# Patient Record
Sex: FEMALE | Race: WHITE | Employment: OTHER | URBAN - METROPOLITAN AREA
[De-identification: names, ages, dates, MRNs, and addresses within clinical notes are randomized per-mention and may not be internally consistent; named-entity substitution may affect disease eponyms.]

---

## 2019-01-01 ENCOUNTER — APPOINTMENT (OUTPATIENT)
Dept: CT IMAGING | Age: 79
DRG: 189 | End: 2019-01-01
Attending: HOSPITALIST
Payer: MEDICARE

## 2019-01-01 ENCOUNTER — HOSPITAL ENCOUNTER (INPATIENT)
Age: 79
LOS: 6 days | Discharge: HOSPICE | DRG: 189 | End: 2019-09-18
Attending: EMERGENCY MEDICINE | Admitting: HOSPITALIST
Payer: MEDICARE

## 2019-01-01 ENCOUNTER — HOME CARE VISIT (OUTPATIENT)
Dept: SCHEDULING | Facility: HOME HEALTH | Age: 79
End: 2019-01-01
Payer: MEDICARE

## 2019-01-01 ENCOUNTER — APPOINTMENT (OUTPATIENT)
Dept: GENERAL RADIOLOGY | Age: 79
DRG: 189 | End: 2019-01-01
Attending: EMERGENCY MEDICINE
Payer: MEDICARE

## 2019-01-01 ENCOUNTER — PATIENT OUTREACH (OUTPATIENT)
Dept: FAMILY MEDICINE CLINIC | Age: 79
End: 2019-01-01

## 2019-01-01 ENCOUNTER — HOME CARE VISIT (OUTPATIENT)
Dept: HOSPICE | Facility: HOSPICE | Age: 79
End: 2019-01-01
Payer: MEDICARE

## 2019-01-01 ENCOUNTER — APPOINTMENT (OUTPATIENT)
Dept: VASCULAR SURGERY | Age: 79
DRG: 189 | End: 2019-01-01
Attending: INTERNAL MEDICINE
Payer: MEDICARE

## 2019-01-01 ENCOUNTER — HOSPICE ADMISSION (OUTPATIENT)
Dept: HOSPICE | Facility: HOSPICE | Age: 79
End: 2019-01-01
Payer: MEDICARE

## 2019-01-01 ENCOUNTER — APPOINTMENT (OUTPATIENT)
Dept: NON INVASIVE DIAGNOSTICS | Age: 79
DRG: 189 | End: 2019-01-01
Attending: INTERNAL MEDICINE
Payer: MEDICARE

## 2019-01-01 VITALS
HEART RATE: 104 BPM | SYSTOLIC BLOOD PRESSURE: 108 MMHG | HEIGHT: 59 IN | RESPIRATION RATE: 22 BRPM | WEIGHT: 106 LBS | OXYGEN SATURATION: 95 % | TEMPERATURE: 98.4 F | BODY MASS INDEX: 21.37 KG/M2 | DIASTOLIC BLOOD PRESSURE: 66 MMHG

## 2019-01-01 VITALS — OXYGEN SATURATION: 94 % | TEMPERATURE: 96.3 F | RESPIRATION RATE: 16 BRPM | HEART RATE: 85 BPM

## 2019-01-01 VITALS
HEIGHT: 59 IN | RESPIRATION RATE: 18 BRPM | BODY MASS INDEX: 21.38 KG/M2 | SYSTOLIC BLOOD PRESSURE: 168 MMHG | DIASTOLIC BLOOD PRESSURE: 55 MMHG | TEMPERATURE: 97.8 F | HEART RATE: 60 BPM | OXYGEN SATURATION: 100 % | WEIGHT: 106.04 LBS

## 2019-01-01 VITALS — OXYGEN SATURATION: 93 % | OXYGEN SATURATION: 81 %

## 2019-01-01 VITALS
OXYGEN SATURATION: 97 % | HEART RATE: 81 BPM | TEMPERATURE: 97.8 F | DIASTOLIC BLOOD PRESSURE: 58 MMHG | RESPIRATION RATE: 18 BRPM | SYSTOLIC BLOOD PRESSURE: 158 MMHG

## 2019-01-01 DIAGNOSIS — J96.22 ACUTE ON CHRONIC RESPIRATORY FAILURE WITH HYPOXIA AND HYPERCAPNIA (HCC): ICD-10-CM

## 2019-01-01 DIAGNOSIS — J96.21 ACUTE ON CHRONIC RESPIRATORY FAILURE WITH HYPOXIA AND HYPERCAPNIA (HCC): ICD-10-CM

## 2019-01-01 DIAGNOSIS — J44.1 COPD EXACERBATION (HCC): ICD-10-CM

## 2019-01-01 DIAGNOSIS — Z51.5 COMFORT MEASURES ONLY STATUS: ICD-10-CM

## 2019-01-01 DIAGNOSIS — J96.02 ACUTE RESPIRATORY FAILURE WITH HYPOXIA AND HYPERCAPNIA (HCC): Primary | ICD-10-CM

## 2019-01-01 DIAGNOSIS — R91.8 LUNG MASS: ICD-10-CM

## 2019-01-01 DIAGNOSIS — Z71.89 ADVANCED CARE PLANNING/COUNSELING DISCUSSION: ICD-10-CM

## 2019-01-01 DIAGNOSIS — J96.01 ACUTE RESPIRATORY FAILURE WITH HYPOXIA AND HYPERCAPNIA (HCC): Primary | ICD-10-CM

## 2019-01-01 DIAGNOSIS — Z99.89 BIPAP (BIPHASIC POSITIVE AIRWAY PRESSURE) DEPENDENCE: ICD-10-CM

## 2019-01-01 LAB
ALBUMIN SERPL-MCNC: 2.7 G/DL (ref 3.4–5)
ALBUMIN SERPL-MCNC: 3.6 G/DL (ref 3.4–5)
ALBUMIN/GLOB SERPL: 1.3 {RATIO} (ref 0.8–1.7)
ALBUMIN/GLOB SERPL: 1.4 {RATIO} (ref 0.8–1.7)
ALP SERPL-CCNC: 108 U/L (ref 45–117)
ALP SERPL-CCNC: 64 U/L (ref 45–117)
ALT SERPL-CCNC: 25 U/L (ref 13–56)
ALT SERPL-CCNC: 35 U/L (ref 13–56)
ANION GAP SERPL CALC-SCNC: 5 MMOL/L (ref 3–18)
ANION GAP SERPL CALC-SCNC: 6 MMOL/L (ref 3–18)
ANION GAP SERPL CALC-SCNC: 6 MMOL/L (ref 3–18)
ANION GAP SERPL CALC-SCNC: ABNORMAL MMOL/L (ref 3–18)
ANION GAP SERPL CALC-SCNC: ABNORMAL MMOL/L (ref 3–18)
APPEARANCE UR: CLEAR
ARTERIAL PATENCY WRIST A: ABNORMAL
ARTERIAL PATENCY WRIST A: ABNORMAL
ARTERIAL PATENCY WRIST A: YES
AST SERPL-CCNC: 21 U/L (ref 10–38)
AST SERPL-CCNC: 22 U/L (ref 10–38)
ATRIAL RATE: 76 BPM
BACTERIA SPEC CULT: NORMAL
BACTERIA URNS QL MICRO: ABNORMAL /HPF
BASE EXCESS BLD CALC-SCNC: 19 MMOL/L
BASE EXCESS BLD CALC-SCNC: 22 MMOL/L
BASE EXCESS BLD CALC-SCNC: 26 MMOL/L
BASE EXCESS BLD CALC-SCNC: 27 MMOL/L
BASE EXCESS BLDV CALC-SCNC: 26 MMOL/L
BASOPHILS # BLD: 0 K/UL (ref 0–0.1)
BASOPHILS NFR BLD: 0 % (ref 0–2)
BDY SITE: ABNORMAL
BILIRUB DIRECT SERPL-MCNC: 0.3 MG/DL (ref 0–0.2)
BILIRUB SERPL-MCNC: 0.7 MG/DL (ref 0.2–1)
BILIRUB SERPL-MCNC: 0.8 MG/DL (ref 0.2–1)
BILIRUB UR QL: NEGATIVE
BNP SERPL-MCNC: 5425 PG/ML (ref 0–1800)
BNP SERPL-MCNC: 6744 PG/ML (ref 0–1800)
BODY TEMPERATURE: 97.2
BODY TEMPERATURE: 98.3
BUN SERPL-MCNC: 20 MG/DL (ref 7–18)
BUN SERPL-MCNC: 22 MG/DL (ref 7–18)
BUN SERPL-MCNC: 25 MG/DL (ref 7–18)
BUN SERPL-MCNC: 34 MG/DL (ref 7–18)
BUN SERPL-MCNC: 39 MG/DL (ref 7–18)
BUN/CREAT SERPL: 20 (ref 12–20)
BUN/CREAT SERPL: 23 (ref 12–20)
BUN/CREAT SERPL: 24 (ref 12–20)
BUN/CREAT SERPL: 28 (ref 12–20)
BUN/CREAT SERPL: 36 (ref 12–20)
CALCIUM SERPL-MCNC: 8.2 MG/DL (ref 8.5–10.1)
CALCIUM SERPL-MCNC: 8.3 MG/DL (ref 8.5–10.1)
CALCIUM SERPL-MCNC: 8.3 MG/DL (ref 8.5–10.1)
CALCIUM SERPL-MCNC: 8.4 MG/DL (ref 8.5–10.1)
CALCIUM SERPL-MCNC: 8.8 MG/DL (ref 8.5–10.1)
CALCULATED P AXIS, ECG09: 86 DEGREES
CALCULATED R AXIS, ECG10: 87 DEGREES
CALCULATED T AXIS, ECG11: 56 DEGREES
CHLORIDE SERPL-SCNC: 103 MMOL/L (ref 100–111)
CHLORIDE SERPL-SCNC: 87 MMOL/L (ref 100–111)
CHLORIDE SERPL-SCNC: 89 MMOL/L (ref 100–111)
CHLORIDE SERPL-SCNC: 92 MMOL/L (ref 100–111)
CHLORIDE SERPL-SCNC: 97 MMOL/L (ref 100–111)
CK MB CFR SERPL CALC: 10 % (ref 0–4)
CK MB SERPL-MCNC: 3 NG/ML (ref 5–25)
CK SERPL-CCNC: 30 U/L (ref 26–192)
CO2 SERPL-SCNC: 34 MMOL/L (ref 21–32)
CO2 SERPL-SCNC: 36 MMOL/L (ref 21–32)
CO2 SERPL-SCNC: 43 MMOL/L (ref 21–32)
CO2 SERPL-SCNC: >45 MMOL/L (ref 21–32)
CO2 SERPL-SCNC: >45 MMOL/L (ref 21–32)
COLOR UR: YELLOW
CREAT SERPL-MCNC: 0.88 MG/DL (ref 0.6–1.3)
CREAT SERPL-MCNC: 1.06 MG/DL (ref 0.6–1.3)
CREAT SERPL-MCNC: 1.08 MG/DL (ref 0.6–1.3)
CREAT SERPL-MCNC: 1.09 MG/DL (ref 0.6–1.3)
CREAT SERPL-MCNC: 1.21 MG/DL (ref 0.6–1.3)
DIAGNOSIS, 93000: NORMAL
DIFFERENTIAL METHOD BLD: ABNORMAL
ECHO AO ROOT DIAM: 3.2 CM
ECHO LV E' LATERAL VELOCITY: 6 CM/S
ECHO LV E' SEPTAL VELOCITY: 4 CM/S
ECHO LV EDV A2C: 32.4 ML
ECHO LV EDV A4C: 32.3 ML
ECHO LV EDV BP: 34 ML (ref 56–104)
ECHO LV EDV INDEX A4C: 22.9 ML/M2
ECHO LV EDV INDEX BP: 24.1 ML/M2
ECHO LV EDV NDEX A2C: 23 ML/M2
ECHO LV EJECTION FRACTION A2C: 60 %
ECHO LV EJECTION FRACTION A4C: 68 %
ECHO LV EJECTION FRACTION BIPLANE: 64.8 % (ref 55–100)
ECHO LV ESV A2C: 13.1 ML
ECHO LV ESV A4C: 10.4 ML
ECHO LV ESV BP: 12 ML (ref 19–49)
ECHO LV ESV INDEX A2C: 9.3 ML/M2
ECHO LV ESV INDEX A4C: 7.4 ML/M2
ECHO LV ESV INDEX BP: 8.5 ML/M2
ECHO LV INTERNAL DIMENSION DIASTOLIC: 4.12 CM (ref 3.9–5.3)
ECHO LV INTERNAL DIMENSION SYSTOLIC: 2.57 CM
ECHO LV IVSD: 0.89 CM (ref 0.6–0.9)
ECHO LV MASS 2D: 123.2 G (ref 67–162)
ECHO LV MASS INDEX 2D: 87.5 G/M2 (ref 43–95)
ECHO LV POSTERIOR WALL DIASTOLIC: 0.86 CM (ref 0.6–0.9)
ECHO LVOT DIAM: 1.88 CM
ECHO MV A VELOCITY: 190.51 CM/S
ECHO MV AREA PHT: 7.1 CM2
ECHO MV E DECELERATION TIME (DT): 106.8 MS
ECHO MV E VELOCITY: 115.6 CM/S
ECHO MV E/A RATIO: 0.61
ECHO MV E/E' LATERAL: 19.27
ECHO MV E/E' RATIO (AVERAGED): 24.08
ECHO MV E/E' SEPTAL: 28.9
ECHO MV PRESSURE HALF TIME (PHT): 31 MS
ECHO TV REGURGITANT MAX VELOCITY: 279.91 CM/S
ECHO TV REGURGITANT PEAK GRADIENT: 31.3 MMHG
EOSINOPHIL # BLD: 0 K/UL (ref 0–0.4)
EOSINOPHIL NFR BLD: 0 % (ref 0–5)
EOSINOPHIL NFR BLD: 1 % (ref 0–5)
EPITH CASTS URNS QL MICRO: ABNORMAL /LPF (ref 0–5)
ERYTHROCYTE [DISTWIDTH] IN BLOOD BY AUTOMATED COUNT: 14 % (ref 11.6–14.5)
ERYTHROCYTE [DISTWIDTH] IN BLOOD BY AUTOMATED COUNT: 14 % (ref 11.6–14.5)
ERYTHROCYTE [DISTWIDTH] IN BLOOD BY AUTOMATED COUNT: 14.2 % (ref 11.6–14.5)
ERYTHROCYTE [DISTWIDTH] IN BLOOD BY AUTOMATED COUNT: 14.2 % (ref 11.6–14.5)
ERYTHROCYTE [DISTWIDTH] IN BLOOD BY AUTOMATED COUNT: 14.4 % (ref 11.6–14.5)
EST. AVERAGE GLUCOSE BLD GHB EST-MCNC: 143 MG/DL
GAS FLOW.O2 O2 DELIVERY SYS: ABNORMAL L/MIN
GLOBULIN SER CALC-MCNC: 2.1 G/DL (ref 2–4)
GLOBULIN SER CALC-MCNC: 2.6 G/DL (ref 2–4)
GLUCOSE BLD STRIP.AUTO-MCNC: 103 MG/DL (ref 70–110)
GLUCOSE BLD STRIP.AUTO-MCNC: 111 MG/DL (ref 70–110)
GLUCOSE BLD STRIP.AUTO-MCNC: 112 MG/DL (ref 70–110)
GLUCOSE BLD STRIP.AUTO-MCNC: 112 MG/DL (ref 70–110)
GLUCOSE BLD STRIP.AUTO-MCNC: 113 MG/DL (ref 70–110)
GLUCOSE BLD STRIP.AUTO-MCNC: 113 MG/DL (ref 70–110)
GLUCOSE BLD STRIP.AUTO-MCNC: 114 MG/DL (ref 70–110)
GLUCOSE BLD STRIP.AUTO-MCNC: 118 MG/DL (ref 70–110)
GLUCOSE BLD STRIP.AUTO-MCNC: 125 MG/DL (ref 70–110)
GLUCOSE BLD STRIP.AUTO-MCNC: 126 MG/DL (ref 70–110)
GLUCOSE BLD STRIP.AUTO-MCNC: 126 MG/DL (ref 70–110)
GLUCOSE BLD STRIP.AUTO-MCNC: 131 MG/DL (ref 70–110)
GLUCOSE BLD STRIP.AUTO-MCNC: 133 MG/DL (ref 70–110)
GLUCOSE BLD STRIP.AUTO-MCNC: 135 MG/DL (ref 70–110)
GLUCOSE BLD STRIP.AUTO-MCNC: 183 MG/DL (ref 70–110)
GLUCOSE BLD STRIP.AUTO-MCNC: 185 MG/DL (ref 70–110)
GLUCOSE SERPL-MCNC: 108 MG/DL (ref 74–99)
GLUCOSE SERPL-MCNC: 109 MG/DL (ref 74–99)
GLUCOSE SERPL-MCNC: 125 MG/DL (ref 74–99)
GLUCOSE SERPL-MCNC: 154 MG/DL (ref 74–99)
GLUCOSE SERPL-MCNC: 95 MG/DL (ref 74–99)
GLUCOSE UR STRIP.AUTO-MCNC: NEGATIVE MG/DL
HBA1C MFR BLD: 6.6 % (ref 4.2–5.6)
HCO3 BLD-SCNC: 42.6 MMOL/L (ref 22–26)
HCO3 BLD-SCNC: 47.3 MMOL/L (ref 22–26)
HCO3 BLD-SCNC: 50.6 MMOL/L (ref 22–26)
HCO3 BLD-SCNC: 51.1 MMOL/L (ref 22–26)
HCO3 BLDV-SCNC: 51 MMOL/L (ref 23–28)
HCT VFR BLD AUTO: 31.9 % (ref 35–45)
HCT VFR BLD AUTO: 36.7 % (ref 35–45)
HCT VFR BLD AUTO: 36.7 % (ref 35–45)
HCT VFR BLD AUTO: 37.4 % (ref 35–45)
HCT VFR BLD AUTO: 45.6 % (ref 35–45)
HGB BLD-MCNC: 10.1 G/DL (ref 12–16)
HGB BLD-MCNC: 11.1 G/DL (ref 12–16)
HGB BLD-MCNC: 11.2 G/DL (ref 12–16)
HGB BLD-MCNC: 11.3 G/DL (ref 12–16)
HGB BLD-MCNC: 13.3 G/DL (ref 12–16)
HGB UR QL STRIP: NEGATIVE
INR PPP: 1.1 (ref 0.8–1.2)
KETONES UR QL STRIP.AUTO: NEGATIVE MG/DL
LACTATE BLD-SCNC: 1.49 MMOL/L (ref 0.4–2)
LACTATE SERPL-SCNC: 0.8 MMOL/L (ref 0.4–2)
LACTATE SERPL-SCNC: 1.6 MMOL/L (ref 0.4–2)
LEUKOCYTE ESTERASE UR QL STRIP.AUTO: ABNORMAL
LYMPHOCYTES # BLD: 0.3 K/UL (ref 0.9–3.6)
LYMPHOCYTES # BLD: 0.4 K/UL (ref 0.9–3.6)
LYMPHOCYTES # BLD: 0.7 K/UL (ref 0.9–3.6)
LYMPHOCYTES NFR BLD: 10 % (ref 21–52)
LYMPHOCYTES NFR BLD: 13 % (ref 21–52)
LYMPHOCYTES NFR BLD: 15 % (ref 21–52)
LYMPHOCYTES NFR BLD: 22 % (ref 21–52)
LYMPHOCYTES NFR BLD: 7 % (ref 21–52)
MAGNESIUM SERPL-MCNC: 2.1 MG/DL (ref 1.6–2.6)
MCH RBC QN AUTO: 28.8 PG (ref 24–34)
MCH RBC QN AUTO: 29 PG (ref 24–34)
MCH RBC QN AUTO: 29 PG (ref 24–34)
MCH RBC QN AUTO: 29.2 PG (ref 24–34)
MCH RBC QN AUTO: 29.7 PG (ref 24–34)
MCHC RBC AUTO-ENTMCNC: 29.2 G/DL (ref 31–37)
MCHC RBC AUTO-ENTMCNC: 30.2 G/DL (ref 31–37)
MCHC RBC AUTO-ENTMCNC: 30.2 G/DL (ref 31–37)
MCHC RBC AUTO-ENTMCNC: 30.5 G/DL (ref 31–37)
MCHC RBC AUTO-ENTMCNC: 31.7 G/DL (ref 31–37)
MCV RBC AUTO: 93.8 FL (ref 74–97)
MCV RBC AUTO: 95.1 FL (ref 74–97)
MCV RBC AUTO: 95.6 FL (ref 74–97)
MCV RBC AUTO: 96.1 FL (ref 74–97)
MCV RBC AUTO: 99.3 FL (ref 74–97)
MONOCYTES # BLD: 0.1 K/UL (ref 0.05–1.2)
MONOCYTES # BLD: 0.1 K/UL (ref 0.05–1.2)
MONOCYTES # BLD: 0.2 K/UL (ref 0.05–1.2)
MONOCYTES NFR BLD: 2 % (ref 3–10)
MONOCYTES NFR BLD: 4 % (ref 3–10)
MONOCYTES NFR BLD: 5 % (ref 3–10)
MONOCYTES NFR BLD: 6 % (ref 3–10)
MONOCYTES NFR BLD: 6 % (ref 3–10)
NEUTS SEG # BLD: 2 K/UL (ref 1.8–8)
NEUTS SEG # BLD: 2.2 K/UL (ref 1.8–8)
NEUTS SEG # BLD: 2.3 K/UL (ref 1.8–8)
NEUTS SEG # BLD: 2.5 K/UL (ref 1.8–8)
NEUTS SEG # BLD: 3.3 K/UL (ref 1.8–8)
NEUTS SEG NFR BLD: 71 % (ref 40–73)
NEUTS SEG NFR BLD: 83 % (ref 40–73)
NEUTS SEG NFR BLD: 83 % (ref 40–73)
NEUTS SEG NFR BLD: 84 % (ref 40–73)
NEUTS SEG NFR BLD: 88 % (ref 40–73)
NITRITE UR QL STRIP.AUTO: NEGATIVE
O2/TOTAL GAS SETTING VFR VENT: 0.35 %
O2/TOTAL GAS SETTING VFR VENT: 0.35 %
O2/TOTAL GAS SETTING VFR VENT: 28 %
O2/TOTAL GAS SETTING VFR VENT: 28 %
O2/TOTAL GAS SETTING VFR VENT: 35 %
P-R INTERVAL, ECG05: 142 MS
PCO2 BLD: 60 MMHG (ref 35–45)
PCO2 BLD: 71.5 MMHG (ref 35–45)
PCO2 BLD: 76.7 MMHG (ref 35–45)
PCO2 BLD: 81.2 MMHG (ref 35–45)
PCO2 BLDV: 84.1 MMHG (ref 41–51)
PEEP RESPIRATORY: 6 CMH2O
PH BLD: 7.39 [PH] (ref 7.35–7.45)
PH BLD: 7.4 [PH] (ref 7.35–7.45)
PH BLD: 7.46 [PH] (ref 7.35–7.45)
PH BLD: 7.46 [PH] (ref 7.35–7.45)
PH BLDV: 7.39 [PH] (ref 7.32–7.42)
PH UR STRIP: 8 [PH] (ref 5–8)
PIP ISTAT,IPIP: 14
PIP ISTAT,IPIP: 14
PIP ISTAT,IPIP: 16
PLATELET # BLD AUTO: 102 K/UL (ref 135–420)
PLATELET # BLD AUTO: 122 K/UL (ref 135–420)
PLATELET # BLD AUTO: 78 K/UL (ref 135–420)
PLATELET # BLD AUTO: 82 K/UL (ref 135–420)
PLATELET # BLD AUTO: 83 K/UL (ref 135–420)
PLATELET COMMENTS,PCOM: ABNORMAL
PMV BLD AUTO: 10.7 FL (ref 9.2–11.8)
PMV BLD AUTO: 10.9 FL (ref 9.2–11.8)
PMV BLD AUTO: 11.1 FL (ref 9.2–11.8)
PMV BLD AUTO: 11.2 FL (ref 9.2–11.8)
PMV BLD AUTO: 11.2 FL (ref 9.2–11.8)
PO2 BLD: 59 MMHG (ref 80–100)
PO2 BLD: 72 MMHG (ref 80–100)
PO2 BLD: 74 MMHG (ref 80–100)
PO2 BLD: 88 MMHG (ref 80–100)
PO2 BLDV: 38 MMHG (ref 25–40)
POTASSIUM SERPL-SCNC: 3 MMOL/L (ref 3.5–5.5)
POTASSIUM SERPL-SCNC: 3.4 MMOL/L (ref 3.5–5.5)
POTASSIUM SERPL-SCNC: 3.5 MMOL/L (ref 3.5–5.5)
POTASSIUM SERPL-SCNC: 3.9 MMOL/L (ref 3.5–5.5)
POTASSIUM SERPL-SCNC: 3.9 MMOL/L (ref 3.5–5.5)
POTASSIUM SERPL-SCNC: 4.3 MMOL/L (ref 3.5–5.5)
PRESSURE SUPPORT SETTING VENT: 10 CMH2O
PRESSURE SUPPORT SETTING VENT: 10 CMH2O
PROCALCITONIN SERPL-MCNC: 0.1 NG/ML
PROT SERPL-MCNC: 4.8 G/DL (ref 6.4–8.2)
PROT SERPL-MCNC: 6.2 G/DL (ref 6.4–8.2)
PROT UR STRIP-MCNC: 30 MG/DL
PROTHROMBIN TIME: 13.5 SEC (ref 11.5–15.2)
Q-T INTERVAL, ECG07: 360 MS
QRS DURATION, ECG06: 68 MS
QTC CALCULATION (BEZET), ECG08: 405 MS
RBC # BLD AUTO: 3.4 M/UL (ref 4.2–5.3)
RBC # BLD AUTO: 3.84 M/UL (ref 4.2–5.3)
RBC # BLD AUTO: 3.86 M/UL (ref 4.2–5.3)
RBC # BLD AUTO: 3.89 M/UL (ref 4.2–5.3)
RBC # BLD AUTO: 4.59 M/UL (ref 4.2–5.3)
RBC #/AREA URNS HPF: ABNORMAL /HPF (ref 0–5)
RBC MORPH BLD: ABNORMAL
SAO2 % BLD: 88 % (ref 92–97)
SAO2 % BLD: 94 % (ref 92–97)
SAO2 % BLD: 95 % (ref 92–97)
SAO2 % BLD: 96 % (ref 92–97)
SAO2 % BLDV: 67 % (ref 65–88)
SERVICE CMNT-IMP: ABNORMAL
SERVICE CMNT-IMP: NORMAL
SODIUM SERPL-SCNC: 139 MMOL/L (ref 136–145)
SODIUM SERPL-SCNC: 140 MMOL/L (ref 136–145)
SODIUM SERPL-SCNC: 140 MMOL/L (ref 136–145)
SODIUM SERPL-SCNC: 141 MMOL/L (ref 136–145)
SODIUM SERPL-SCNC: 142 MMOL/L (ref 136–145)
SP GR UR REFRACTOMETRY: 1.03 (ref 1–1.03)
SPECIMEN TYPE: ABNORMAL
SPONTANEOUS TIMED, IST: YES
TOTAL RESP. RATE, ITRR: 15
TOTAL RESP. RATE, ITRR: 16
TOTAL RESP. RATE, ITRR: 20
TOTAL RESP. RATE, ITRR: 20
TOTAL RESP. RATE, ITRR: 30
TROPONIN I SERPL-MCNC: <0.02 NG/ML (ref 0–0.04)
UROBILINOGEN UR QL STRIP.AUTO: 1 EU/DL (ref 0.2–1)
VANCOMYCIN TROUGH SERPL-MCNC: 18.4 UG/ML (ref 10–20)
VENTRICULAR RATE, ECG03: 76 BPM
WBC # BLD AUTO: 2.4 K/UL (ref 4.6–13.2)
WBC # BLD AUTO: 2.7 K/UL (ref 4.6–13.2)
WBC # BLD AUTO: 2.7 K/UL (ref 4.6–13.2)
WBC # BLD AUTO: 3.4 K/UL (ref 4.6–13.2)
WBC # BLD AUTO: 3.7 K/UL (ref 4.6–13.2)
WBC URNS QL MICRO: ABNORMAL /HPF (ref 0–5)

## 2019-01-01 PROCEDURE — 74011250637 HC RX REV CODE- 250/637: Performed by: HOSPITALIST

## 2019-01-01 PROCEDURE — 85610 PROTHROMBIN TIME: CPT

## 2019-01-01 PROCEDURE — 82550 ASSAY OF CK (CPK): CPT

## 2019-01-01 PROCEDURE — 92610 EVALUATE SWALLOWING FUNCTION: CPT

## 2019-01-01 PROCEDURE — 65610000006 HC RM INTENSIVE CARE

## 2019-01-01 PROCEDURE — 96365 THER/PROPH/DIAG IV INF INIT: CPT

## 2019-01-01 PROCEDURE — 94640 AIRWAY INHALATION TREATMENT: CPT

## 2019-01-01 PROCEDURE — 80048 BASIC METABOLIC PNL TOTAL CA: CPT

## 2019-01-01 PROCEDURE — G0299 HHS/HOSPICE OF RN EA 15 MIN: HCPCS

## 2019-01-01 PROCEDURE — 82962 GLUCOSE BLOOD TEST: CPT

## 2019-01-01 PROCEDURE — 77030013140 HC MSK NEB VYRM -A

## 2019-01-01 PROCEDURE — 85025 COMPLETE CBC W/AUTO DIFF WBC: CPT

## 2019-01-01 PROCEDURE — 84145 PROCALCITONIN (PCT): CPT

## 2019-01-01 PROCEDURE — 93005 ELECTROCARDIOGRAM TRACING: CPT

## 2019-01-01 PROCEDURE — 93970 EXTREMITY STUDY: CPT

## 2019-01-01 PROCEDURE — 83605 ASSAY OF LACTIC ACID: CPT

## 2019-01-01 PROCEDURE — 77030011256 HC DRSG MEPILEX <16IN NO BORD MOLN -A

## 2019-01-01 PROCEDURE — 83880 ASSAY OF NATRIURETIC PEPTIDE: CPT

## 2019-01-01 PROCEDURE — 82803 BLOOD GASES ANY COMBINATION: CPT

## 2019-01-01 PROCEDURE — 74011250636 HC RX REV CODE- 250/636: Performed by: HOSPITALIST

## 2019-01-01 PROCEDURE — 74011250636 HC RX REV CODE- 250/636

## 2019-01-01 PROCEDURE — 74011000258 HC RX REV CODE- 258: Performed by: INTERNAL MEDICINE

## 2019-01-01 PROCEDURE — 77010033678 HC OXYGEN DAILY

## 2019-01-01 PROCEDURE — 74011000258 HC RX REV CODE- 258: Performed by: HOSPITALIST

## 2019-01-01 PROCEDURE — 36600 WITHDRAWAL OF ARTERIAL BLOOD: CPT

## 2019-01-01 PROCEDURE — 0651 HSPC ROUTINE HOME CARE

## 2019-01-01 PROCEDURE — 74011636637 HC RX REV CODE- 636/637: Performed by: HOSPITALIST

## 2019-01-01 PROCEDURE — T4541 LARGE DISPOSABLE UNDERPAD: HCPCS

## 2019-01-01 PROCEDURE — 3336500001 HSPC ELECTION

## 2019-01-01 PROCEDURE — 74011250636 HC RX REV CODE- 250/636: Performed by: FAMILY MEDICINE

## 2019-01-01 PROCEDURE — HHS10554 SHAMPOO/BODY WASH 8 OZ ALOE VESTA

## 2019-01-01 PROCEDURE — 74011250636 HC RX REV CODE- 250/636: Performed by: INTERNAL MEDICINE

## 2019-01-01 PROCEDURE — 84132 ASSAY OF SERUM POTASSIUM: CPT

## 2019-01-01 PROCEDURE — 87040 BLOOD CULTURE FOR BACTERIA: CPT

## 2019-01-01 PROCEDURE — 36415 COLL VENOUS BLD VENIPUNCTURE: CPT

## 2019-01-01 PROCEDURE — 77030037878 HC DRSG MEPILEX >48IN BORD MOLN -B

## 2019-01-01 PROCEDURE — 74011000250 HC RX REV CODE- 250: Performed by: HOSPITALIST

## 2019-01-01 PROCEDURE — 87086 URINE CULTURE/COLONY COUNT: CPT

## 2019-01-01 PROCEDURE — 74011000250 HC RX REV CODE- 250: Performed by: INTERNAL MEDICINE

## 2019-01-01 PROCEDURE — G0155 HHCP-SVS OF CSW,EA 15 MIN: HCPCS

## 2019-01-01 PROCEDURE — T4523 ADULT SIZE BRIEF/DIAPER LG: HCPCS

## 2019-01-01 PROCEDURE — 83735 ASSAY OF MAGNESIUM: CPT

## 2019-01-01 PROCEDURE — 77030037875 HC DRSG MEPILEX <16IN BORD MOLN -A

## 2019-01-01 PROCEDURE — 65270000029 HC RM PRIVATE

## 2019-01-01 PROCEDURE — 74011250636 HC RX REV CODE- 250/636: Performed by: NURSE PRACTITIONER

## 2019-01-01 PROCEDURE — 74011636320 HC RX REV CODE- 636/320: Performed by: HOSPITALIST

## 2019-01-01 PROCEDURE — 80202 ASSAY OF VANCOMYCIN: CPT

## 2019-01-01 PROCEDURE — 74011000250 HC RX REV CODE- 250: Performed by: EMERGENCY MEDICINE

## 2019-01-01 PROCEDURE — 93308 TTE F-UP OR LMTD: CPT

## 2019-01-01 PROCEDURE — 99285 EMERGENCY DEPT VISIT HI MDM: CPT

## 2019-01-01 PROCEDURE — 76450000000

## 2019-01-01 PROCEDURE — 71275 CT ANGIOGRAPHY CHEST: CPT

## 2019-01-01 PROCEDURE — A6250 SKIN SEAL PROTECT MOISTURIZR: HCPCS

## 2019-01-01 PROCEDURE — 81001 URINALYSIS AUTO W/SCOPE: CPT

## 2019-01-01 PROCEDURE — 96375 TX/PRO/DX INJ NEW DRUG ADDON: CPT

## 2019-01-01 PROCEDURE — 80053 COMPREHEN METABOLIC PANEL: CPT

## 2019-01-01 PROCEDURE — 94660 CPAP INITIATION&MGMT: CPT

## 2019-01-01 PROCEDURE — 5A09357 ASSISTANCE WITH RESPIRATORY VENTILATION, LESS THAN 24 CONSECUTIVE HOURS, CONTINUOUS POSITIVE AIRWAY PRESSURE: ICD-10-PCS | Performed by: HOSPITALIST

## 2019-01-01 PROCEDURE — 74011636637 HC RX REV CODE- 636/637: Performed by: INTERNAL MEDICINE

## 2019-01-01 PROCEDURE — HOSPICE MEDICATION HC HH HOSPICE MEDICATION

## 2019-01-01 PROCEDURE — 83036 HEMOGLOBIN GLYCOSYLATED A1C: CPT

## 2019-01-01 PROCEDURE — 3331090004 HSPC SERVICE INTENSITY ADD-ON

## 2019-01-01 PROCEDURE — A4927 NON-STERILE GLOVES: HCPCS

## 2019-01-01 PROCEDURE — P9047 ALBUMIN (HUMAN), 25%, 50ML: HCPCS | Performed by: INTERNAL MEDICINE

## 2019-01-01 PROCEDURE — 74011250637 HC RX REV CODE- 250/637: Performed by: NURSE PRACTITIONER

## 2019-01-01 PROCEDURE — A9270 NON-COVERED ITEM OR SERVICE: HCPCS

## 2019-01-01 PROCEDURE — 77030013052 HC MSK CPAP/BIPAP PHIL -B

## 2019-01-01 PROCEDURE — 80076 HEPATIC FUNCTION PANEL: CPT

## 2019-01-01 PROCEDURE — 77030035694 HC MSK BIPAP FLL FAC PERFMAX PHIL -B

## 2019-01-01 PROCEDURE — 74011250636 HC RX REV CODE- 250/636: Performed by: EMERGENCY MEDICINE

## 2019-01-01 PROCEDURE — 71045 X-RAY EXAM CHEST 1 VIEW: CPT

## 2019-01-01 RX ORDER — INSULIN LISPRO 100 [IU]/ML
INJECTION, SOLUTION INTRAVENOUS; SUBCUTANEOUS
Status: DISCONTINUED | OUTPATIENT
Start: 2019-01-01 | End: 2019-01-01

## 2019-01-01 RX ORDER — ALBUMIN HUMAN 250 G/1000ML
12.5 SOLUTION INTRAVENOUS ONCE
Status: COMPLETED | OUTPATIENT
Start: 2019-01-01 | End: 2019-01-01

## 2019-01-01 RX ORDER — FUROSEMIDE 10 MG/ML
INJECTION INTRAMUSCULAR; INTRAVENOUS
Status: COMPLETED
Start: 2019-01-01 | End: 2019-01-01

## 2019-01-01 RX ORDER — ATENOLOL 25 MG/1
25 TABLET ORAL EVERY EVENING
COMMUNITY
Start: 2019-01-01

## 2019-01-01 RX ORDER — BUDESONIDE 0.5 MG/2ML
500 INHALANT ORAL
Status: DISCONTINUED | OUTPATIENT
Start: 2019-01-01 | End: 2019-01-01

## 2019-01-01 RX ORDER — FUROSEMIDE 10 MG/ML
20 INJECTION INTRAMUSCULAR; INTRAVENOUS ONCE
Status: COMPLETED | OUTPATIENT
Start: 2019-01-01 | End: 2019-01-01

## 2019-01-01 RX ORDER — LORAZEPAM 2 MG/ML
0.5 CONCENTRATE ORAL
Status: DISCONTINUED | OUTPATIENT
Start: 2019-01-01 | End: 2019-01-01 | Stop reason: HOSPADM

## 2019-01-01 RX ORDER — FLUTICASONE FUROATE AND VILANTEROL 100; 25 UG/1; UG/1
1 POWDER RESPIRATORY (INHALATION) DAILY
COMMUNITY
End: 2019-01-01 | Stop reason: ALTCHOICE

## 2019-01-01 RX ORDER — GLYCOPYRROLATE 0.2 MG/ML
0.2 INJECTION INTRAMUSCULAR; INTRAVENOUS
Status: DISCONTINUED | OUTPATIENT
Start: 2019-01-01 | End: 2019-01-01 | Stop reason: HOSPADM

## 2019-01-01 RX ORDER — DIPHENHYDRAMINE HCL 25 MG
50 CAPSULE ORAL ONCE
Status: DISCONTINUED | OUTPATIENT
Start: 2019-01-01 | End: 2019-01-01

## 2019-01-01 RX ORDER — LISINOPRIL AND HYDROCHLOROTHIAZIDE 12.5; 2 MG/1; MG/1
1 TABLET ORAL DAILY
COMMUNITY
End: 2019-01-01 | Stop reason: ALTCHOICE

## 2019-01-01 RX ORDER — DIPHENHYDRAMINE HYDROCHLORIDE 50 MG/ML
50 INJECTION, SOLUTION INTRAMUSCULAR; INTRAVENOUS ONCE
Status: DISCONTINUED | OUTPATIENT
Start: 2019-01-01 | End: 2019-01-01

## 2019-01-01 RX ORDER — FUROSEMIDE 10 MG/ML
20 INJECTION INTRAMUSCULAR; INTRAVENOUS 2 TIMES DAILY
Status: DISCONTINUED | OUTPATIENT
Start: 2019-01-01 | End: 2019-01-01

## 2019-01-01 RX ORDER — DIPHENHYDRAMINE HYDROCHLORIDE 50 MG/ML
50 INJECTION, SOLUTION INTRAMUSCULAR; INTRAVENOUS
Status: COMPLETED | OUTPATIENT
Start: 2019-01-01 | End: 2019-01-01

## 2019-01-01 RX ORDER — MORPHINE SULFATE 2 MG/ML
1-2 INJECTION, SOLUTION INTRAMUSCULAR; INTRAVENOUS
Status: DISCONTINUED | OUTPATIENT
Start: 2019-01-01 | End: 2019-01-01 | Stop reason: HOSPADM

## 2019-01-01 RX ORDER — HALOPERIDOL 5 MG/ML
2 INJECTION INTRAMUSCULAR
Status: DISCONTINUED | OUTPATIENT
Start: 2019-01-01 | End: 2019-01-01 | Stop reason: HOSPADM

## 2019-01-01 RX ORDER — MORPHINE SULFATE 100 MG/5ML
2-6 SOLUTION ORAL
Status: DISCONTINUED | OUTPATIENT
Start: 2019-01-01 | End: 2019-01-01 | Stop reason: HOSPADM

## 2019-01-01 RX ORDER — MORPHINE SULFATE 20 MG/ML
2.5-5 SOLUTION ORAL
Status: DISCONTINUED | OUTPATIENT
Start: 2019-01-01 | End: 2019-01-01

## 2019-01-01 RX ORDER — ONDANSETRON 4 MG/1
4 TABLET, ORALLY DISINTEGRATING ORAL
Status: DISCONTINUED | OUTPATIENT
Start: 2019-01-01 | End: 2019-01-01 | Stop reason: HOSPADM

## 2019-01-01 RX ORDER — POTASSIUM CHLORIDE 7.45 MG/ML
10 INJECTION INTRAVENOUS
Status: COMPLETED | OUTPATIENT
Start: 2019-01-01 | End: 2019-01-01

## 2019-01-01 RX ORDER — INSULIN LISPRO 100 [IU]/ML
INJECTION, SOLUTION INTRAVENOUS; SUBCUTANEOUS EVERY 6 HOURS
Status: DISCONTINUED | OUTPATIENT
Start: 2019-01-01 | End: 2019-01-01

## 2019-01-01 RX ORDER — HEPARIN SODIUM 5000 [USP'U]/ML
5000 INJECTION, SOLUTION INTRAVENOUS; SUBCUTANEOUS EVERY 8 HOURS
Status: DISCONTINUED | OUTPATIENT
Start: 2019-01-01 | End: 2019-01-01

## 2019-01-01 RX ORDER — HALOPERIDOL 5 MG/ML
2 INJECTION INTRAMUSCULAR ONCE
Status: COMPLETED | OUTPATIENT
Start: 2019-01-01 | End: 2019-01-01

## 2019-01-01 RX ORDER — IPRATROPIUM BROMIDE AND ALBUTEROL SULFATE 2.5; .5 MG/3ML; MG/3ML
3 SOLUTION RESPIRATORY (INHALATION)
Status: DISCONTINUED | OUTPATIENT
Start: 2019-01-01 | End: 2019-01-01

## 2019-01-01 RX ORDER — IBUPROFEN 200 MG
1 TABLET ORAL EVERY 24 HOURS
COMMUNITY
End: 2019-01-01

## 2019-01-01 RX ORDER — ONDANSETRON 4 MG/1
4 TABLET, ORALLY DISINTEGRATING ORAL
Qty: 20 TAB | Refills: 0 | Status: SHIPPED
Start: 2019-01-01

## 2019-01-01 RX ORDER — POTASSIUM CHLORIDE 7.45 MG/ML
10 INJECTION INTRAVENOUS
Status: DISCONTINUED | OUTPATIENT
Start: 2019-01-01 | End: 2019-01-01

## 2019-01-01 RX ORDER — HYOSCYAMINE SULFATE 0.12 MG/1
0.12 TABLET SUBLINGUAL
Qty: 20 TAB | Refills: 0 | Status: SHIPPED | OUTPATIENT
Start: 2019-01-01

## 2019-01-01 RX ORDER — MAGNESIUM SULFATE 100 %
4 CRYSTALS MISCELLANEOUS AS NEEDED
Status: DISCONTINUED | OUTPATIENT
Start: 2019-01-01 | End: 2019-01-01

## 2019-01-01 RX ORDER — BUMETANIDE 2 MG/1
2 TABLET ORAL 2 TIMES DAILY
COMMUNITY
End: 2019-01-01

## 2019-01-01 RX ORDER — LORAZEPAM 2 MG/ML
0.5 CONCENTRATE ORAL
Qty: 1 ML | Refills: 0 | Status: SHIPPED | OUTPATIENT
Start: 2019-01-01

## 2019-01-01 RX ORDER — MORPHINE SULFATE 2 MG/ML
INJECTION, SOLUTION INTRAMUSCULAR; INTRAVENOUS
Status: COMPLETED
Start: 2019-01-01 | End: 2019-01-01

## 2019-01-01 RX ORDER — ENOXAPARIN SODIUM 100 MG/ML
1 INJECTION SUBCUTANEOUS EVERY 12 HOURS
Status: DISCONTINUED | OUTPATIENT
Start: 2019-01-01 | End: 2019-01-01

## 2019-01-01 RX ORDER — IBUPROFEN 200 MG
1 TABLET ORAL EVERY 24 HOURS
Status: DISCONTINUED | OUTPATIENT
Start: 2019-01-01 | End: 2019-01-01 | Stop reason: HOSPADM

## 2019-01-01 RX ORDER — FUROSEMIDE 20 MG/1
20 TABLET ORAL DAILY
Status: DISCONTINUED | OUTPATIENT
Start: 2019-01-01 | End: 2019-01-01

## 2019-01-01 RX ORDER — PREDNISONE 20 MG/1
20 TABLET ORAL
COMMUNITY
End: 2019-01-01

## 2019-01-01 RX ORDER — FUROSEMIDE 20 MG/1
20 TABLET ORAL DAILY
COMMUNITY
Start: 2019-01-01

## 2019-01-01 RX ORDER — RANITIDINE 150 MG/1
150 TABLET, FILM COATED ORAL DAILY
COMMUNITY
End: 2019-01-01 | Stop reason: ALTCHOICE

## 2019-01-01 RX ORDER — FUROSEMIDE 10 MG/ML
20 INJECTION INTRAMUSCULAR; INTRAVENOUS DAILY
Status: DISCONTINUED | OUTPATIENT
Start: 2019-01-01 | End: 2019-01-01 | Stop reason: HOSPADM

## 2019-01-01 RX ORDER — HYOSCYAMINE SULFATE 0.12 MG/1
0.12 TABLET SUBLINGUAL
Status: DISCONTINUED | OUTPATIENT
Start: 2019-01-01 | End: 2019-01-01 | Stop reason: HOSPADM

## 2019-01-01 RX ORDER — IPRATROPIUM BROMIDE AND ALBUTEROL SULFATE 2.5; .5 MG/3ML; MG/3ML
3 SOLUTION RESPIRATORY (INHALATION)
Status: COMPLETED | OUTPATIENT
Start: 2019-01-01 | End: 2019-01-01

## 2019-01-01 RX ORDER — ATENOLOL 50 MG/1
TABLET ORAL DAILY
COMMUNITY
End: 2019-01-01

## 2019-01-01 RX ORDER — ATENOLOL 25 MG/1
25 TABLET ORAL EVERY EVENING
Status: DISCONTINUED | OUTPATIENT
Start: 2019-01-01 | End: 2019-01-01 | Stop reason: HOSPADM

## 2019-01-01 RX ORDER — MORPHINE SULFATE 100 MG/5ML
2-6 SOLUTION ORAL
Qty: 1 ML | Refills: 0 | Status: SHIPPED | OUTPATIENT
Start: 2019-01-01 | End: 2019-01-01

## 2019-01-01 RX ORDER — VANCOMYCIN HYDROCHLORIDE
1250 ONCE
Status: COMPLETED | OUTPATIENT
Start: 2019-01-01 | End: 2019-01-01

## 2019-01-01 RX ORDER — HYDRALAZINE HYDROCHLORIDE 20 MG/ML
10 INJECTION INTRAMUSCULAR; INTRAVENOUS
Status: DISCONTINUED | OUTPATIENT
Start: 2019-01-01 | End: 2019-01-01 | Stop reason: HOSPADM

## 2019-01-01 RX ORDER — MAGNESIUM SULFATE HEPTAHYDRATE 40 MG/ML
2 INJECTION, SOLUTION INTRAVENOUS ONCE
Status: COMPLETED | OUTPATIENT
Start: 2019-01-01 | End: 2019-01-01

## 2019-01-01 RX ORDER — MORPHINE SULFATE 2 MG/ML
2 INJECTION, SOLUTION INTRAMUSCULAR; INTRAVENOUS
Status: DISCONTINUED | OUTPATIENT
Start: 2019-01-01 | End: 2019-01-01

## 2019-01-01 RX ADMIN — FUROSEMIDE 20 MG: 10 INJECTION, SOLUTION INTRAMUSCULAR; INTRAVENOUS at 20:52

## 2019-01-01 RX ADMIN — PIPERACILLIN SODIUM,TAZOBACTAM SODIUM 3.38 G: 3; .375 INJECTION, POWDER, FOR SOLUTION INTRAVENOUS at 17:24

## 2019-01-01 RX ADMIN — MORPHINE SULFATE 2 MG: 2 INJECTION, SOLUTION INTRAMUSCULAR; INTRAVENOUS at 03:59

## 2019-01-01 RX ADMIN — METHYLPREDNISOLONE SODIUM SUCCINATE 40 MG: 40 INJECTION, POWDER, FOR SOLUTION INTRAMUSCULAR; INTRAVENOUS at 05:24

## 2019-01-01 RX ADMIN — IPRATROPIUM BROMIDE AND ALBUTEROL SULFATE 3 ML: .5; 3 SOLUTION RESPIRATORY (INHALATION) at 07:14

## 2019-01-01 RX ADMIN — BUDESONIDE 500 MCG: 0.5 INHALANT RESPIRATORY (INHALATION) at 07:17

## 2019-01-01 RX ADMIN — IPRATROPIUM BROMIDE AND ALBUTEROL SULFATE 3 ML: .5; 3 SOLUTION RESPIRATORY (INHALATION) at 20:33

## 2019-01-01 RX ADMIN — IPRATROPIUM BROMIDE AND ALBUTEROL SULFATE 3 ML: .5; 3 SOLUTION RESPIRATORY (INHALATION) at 11:40

## 2019-01-01 RX ADMIN — FUROSEMIDE 20 MG: 10 INJECTION INTRAMUSCULAR; INTRAVENOUS at 02:21

## 2019-01-01 RX ADMIN — IOPAMIDOL 100 ML: 755 INJECTION, SOLUTION INTRAVENOUS at 12:34

## 2019-01-01 RX ADMIN — HEPARIN SODIUM 5000 UNITS: 5000 INJECTION, SOLUTION INTRAVENOUS; SUBCUTANEOUS at 14:03

## 2019-01-01 RX ADMIN — PIPERACILLIN SODIUM,TAZOBACTAM SODIUM 3.38 G: 3; .375 INJECTION, POWDER, FOR SOLUTION INTRAVENOUS at 08:31

## 2019-01-01 RX ADMIN — DEXMEDETOMIDINE HYDROCHLORIDE 0.4 MCG/KG/HR: 100 INJECTION, SOLUTION INTRAVENOUS at 02:28

## 2019-01-01 RX ADMIN — HEPARIN SODIUM 5000 UNITS: 5000 INJECTION, SOLUTION INTRAVENOUS; SUBCUTANEOUS at 04:01

## 2019-01-01 RX ADMIN — POTASSIUM CHLORIDE 10 MEQ: 10 INJECTION, SOLUTION INTRAVENOUS at 23:34

## 2019-01-01 RX ADMIN — HYDROCORTISONE SODIUM SUCCINATE 200 MG: 250 INJECTION, POWDER, FOR SOLUTION INTRAMUSCULAR; INTRAVENOUS at 12:06

## 2019-01-01 RX ADMIN — IPRATROPIUM BROMIDE AND ALBUTEROL SULFATE 3 ML: .5; 3 SOLUTION RESPIRATORY (INHALATION) at 15:23

## 2019-01-01 RX ADMIN — DOXYCYCLINE 100 MG: 100 INJECTION, POWDER, LYOPHILIZED, FOR SOLUTION INTRAVENOUS at 05:13

## 2019-01-01 RX ADMIN — VANCOMYCIN HYDROCHLORIDE 1250 MG: 10 INJECTION, POWDER, LYOPHILIZED, FOR SOLUTION INTRAVENOUS at 16:30

## 2019-01-01 RX ADMIN — VANCOMYCIN HYDROCHLORIDE 750 MG: 750 INJECTION, POWDER, LYOPHILIZED, FOR SOLUTION INTRAVENOUS at 16:25

## 2019-01-01 RX ADMIN — PIPERACILLIN SODIUM,TAZOBACTAM SODIUM 3.38 G: 3; .375 INJECTION, POWDER, FOR SOLUTION INTRAVENOUS at 03:00

## 2019-01-01 RX ADMIN — METHYLPREDNISOLONE SODIUM SUCCINATE 125 MG: 125 INJECTION, POWDER, FOR SOLUTION INTRAMUSCULAR; INTRAVENOUS at 14:31

## 2019-01-01 RX ADMIN — DOXYCYCLINE 100 MG: 100 INJECTION, POWDER, LYOPHILIZED, FOR SOLUTION INTRAVENOUS at 18:00

## 2019-01-01 RX ADMIN — PIPERACILLIN SODIUM,TAZOBACTAM SODIUM 3.38 G: 3; .375 INJECTION, POWDER, FOR SOLUTION INTRAVENOUS at 09:30

## 2019-01-01 RX ADMIN — POTASSIUM CHLORIDE 10 MEQ: 10 INJECTION, SOLUTION INTRAVENOUS at 10:00

## 2019-01-01 RX ADMIN — MAGNESIUM SULFATE HEPTAHYDRATE 2 G: 40 INJECTION, SOLUTION INTRAVENOUS at 14:31

## 2019-01-01 RX ADMIN — FUROSEMIDE 20 MG: 10 INJECTION, SOLUTION INTRAMUSCULAR; INTRAVENOUS at 02:21

## 2019-01-01 RX ADMIN — PIPERACILLIN SODIUM,TAZOBACTAM SODIUM 3.38 G: 3; .375 INJECTION, POWDER, FOR SOLUTION INTRAVENOUS at 20:52

## 2019-01-01 RX ADMIN — IPRATROPIUM BROMIDE AND ALBUTEROL SULFATE 3 ML: .5; 3 SOLUTION RESPIRATORY (INHALATION) at 11:20

## 2019-01-01 RX ADMIN — BUDESONIDE 500 MCG: 0.5 INHALANT RESPIRATORY (INHALATION) at 20:40

## 2019-01-01 RX ADMIN — FAMOTIDINE 20 MG: 10 INJECTION, SOLUTION INTRAVENOUS at 18:04

## 2019-01-01 RX ADMIN — BUDESONIDE 500 MCG: 0.5 INHALANT RESPIRATORY (INHALATION) at 20:33

## 2019-01-01 RX ADMIN — ATENOLOL 25 MG: 25 TABLET ORAL at 17:36

## 2019-01-01 RX ADMIN — DOXYCYCLINE 100 MG: 100 INJECTION, POWDER, LYOPHILIZED, FOR SOLUTION INTRAVENOUS at 16:39

## 2019-01-01 RX ADMIN — POTASSIUM CHLORIDE 10 MEQ: 10 INJECTION, SOLUTION INTRAVENOUS at 09:52

## 2019-01-01 RX ADMIN — POTASSIUM CHLORIDE 10 MEQ: 10 INJECTION, SOLUTION INTRAVENOUS at 08:34

## 2019-01-01 RX ADMIN — IPRATROPIUM BROMIDE AND ALBUTEROL SULFATE 3 ML: .5; 3 SOLUTION RESPIRATORY (INHALATION) at 14:21

## 2019-01-01 RX ADMIN — METHYLPREDNISOLONE SODIUM SUCCINATE 40 MG: 40 INJECTION, POWDER, FOR SOLUTION INTRAMUSCULAR; INTRAVENOUS at 14:03

## 2019-01-01 RX ADMIN — POTASSIUM CHLORIDE 10 MEQ: 10 INJECTION, SOLUTION INTRAVENOUS at 09:43

## 2019-01-01 RX ADMIN — BUDESONIDE 500 MCG: 0.5 INHALANT RESPIRATORY (INHALATION) at 20:31

## 2019-01-01 RX ADMIN — BUDESONIDE 500 MCG: 0.5 INHALANT RESPIRATORY (INHALATION) at 07:14

## 2019-01-01 RX ADMIN — IPRATROPIUM BROMIDE AND ALBUTEROL SULFATE 3 ML: .5; 3 SOLUTION RESPIRATORY (INHALATION) at 15:20

## 2019-01-01 RX ADMIN — ATENOLOL 25 MG: 25 TABLET ORAL at 18:29

## 2019-01-01 RX ADMIN — IPRATROPIUM BROMIDE AND ALBUTEROL SULFATE 3 ML: .5; 3 SOLUTION RESPIRATORY (INHALATION) at 20:22

## 2019-01-01 RX ADMIN — HYDRALAZINE HYDROCHLORIDE 10 MG: 20 INJECTION INTRAMUSCULAR; INTRAVENOUS at 06:41

## 2019-01-01 RX ADMIN — ENOXAPARIN SODIUM 50 MG: 60 INJECTION SUBCUTANEOUS at 18:06

## 2019-01-01 RX ADMIN — PIPERACILLIN SODIUM,TAZOBACTAM SODIUM 3.38 G: 3; .375 INJECTION, POWDER, FOR SOLUTION INTRAVENOUS at 00:01

## 2019-01-01 RX ADMIN — HEPARIN SODIUM 5000 UNITS: 5000 INJECTION, SOLUTION INTRAVENOUS; SUBCUTANEOUS at 21:40

## 2019-01-01 RX ADMIN — ATENOLOL 25 MG: 25 TABLET ORAL at 17:24

## 2019-01-01 RX ADMIN — PIPERACILLIN SODIUM,TAZOBACTAM SODIUM 3.38 G: 3; .375 INJECTION, POWDER, FOR SOLUTION INTRAVENOUS at 08:24

## 2019-01-01 RX ADMIN — INSULIN LISPRO 2 UNITS: 100 INJECTION, SOLUTION INTRAVENOUS; SUBCUTANEOUS at 21:41

## 2019-01-01 RX ADMIN — VANCOMYCIN HYDROCHLORIDE 750 MG: 750 INJECTION, POWDER, LYOPHILIZED, FOR SOLUTION INTRAVENOUS at 17:39

## 2019-01-01 RX ADMIN — IPRATROPIUM BROMIDE AND ALBUTEROL SULFATE 3 ML: .5; 3 SOLUTION RESPIRATORY (INHALATION) at 07:55

## 2019-01-01 RX ADMIN — DOXYCYCLINE 100 MG: 100 INJECTION, POWDER, LYOPHILIZED, FOR SOLUTION INTRAVENOUS at 05:22

## 2019-01-01 RX ADMIN — INSULIN LISPRO 2 UNITS: 100 INJECTION, SOLUTION INTRAVENOUS; SUBCUTANEOUS at 23:32

## 2019-01-01 RX ADMIN — IPRATROPIUM BROMIDE AND ALBUTEROL SULFATE 3 ML: .5; 3 SOLUTION RESPIRATORY (INHALATION) at 07:17

## 2019-01-01 RX ADMIN — FAMOTIDINE 20 MG: 10 INJECTION, SOLUTION INTRAVENOUS at 16:39

## 2019-01-01 RX ADMIN — HEPARIN SODIUM 5000 UNITS: 5000 INJECTION, SOLUTION INTRAVENOUS; SUBCUTANEOUS at 13:12

## 2019-01-01 RX ADMIN — METHYLPREDNISOLONE SODIUM SUCCINATE 40 MG: 40 INJECTION, POWDER, FOR SOLUTION INTRAMUSCULAR; INTRAVENOUS at 20:11

## 2019-01-01 RX ADMIN — PIPERACILLIN SODIUM,TAZOBACTAM SODIUM 3.38 G: 3; .375 INJECTION, POWDER, FOR SOLUTION INTRAVENOUS at 16:39

## 2019-01-01 RX ADMIN — BUDESONIDE 500 MCG: 0.5 INHALANT RESPIRATORY (INHALATION) at 07:11

## 2019-01-01 RX ADMIN — FUROSEMIDE 20 MG: 10 INJECTION, SOLUTION INTRAMUSCULAR; INTRAVENOUS at 09:11

## 2019-01-01 RX ADMIN — DEXMEDETOMIDINE HYDROCHLORIDE 0.2 MCG/KG/HR: 100 INJECTION, SOLUTION INTRAVENOUS at 09:44

## 2019-01-01 RX ADMIN — BUDESONIDE 500 MCG: 0.5 INHALANT RESPIRATORY (INHALATION) at 07:55

## 2019-01-01 RX ADMIN — METHYLPREDNISOLONE SODIUM SUCCINATE 40 MG: 125 INJECTION, POWDER, FOR SOLUTION INTRAMUSCULAR; INTRAVENOUS at 20:53

## 2019-01-01 RX ADMIN — IPRATROPIUM BROMIDE AND ALBUTEROL SULFATE 3 ML: .5; 3 SOLUTION RESPIRATORY (INHALATION) at 15:22

## 2019-01-01 RX ADMIN — POTASSIUM CHLORIDE 10 MEQ: 10 INJECTION, SOLUTION INTRAVENOUS at 22:27

## 2019-01-01 RX ADMIN — DOXYCYCLINE 100 MG: 100 INJECTION, POWDER, LYOPHILIZED, FOR SOLUTION INTRAVENOUS at 17:36

## 2019-01-01 RX ADMIN — METHYLPREDNISOLONE SODIUM SUCCINATE 50 MG: 125 INJECTION, POWDER, FOR SOLUTION INTRAMUSCULAR; INTRAVENOUS at 20:53

## 2019-01-01 RX ADMIN — HALOPERIDOL LACTATE 2 MG: 5 INJECTION, SOLUTION INTRAMUSCULAR at 02:21

## 2019-01-01 RX ADMIN — HALOPERIDOL LACTATE 2 MG: 5 INJECTION, SOLUTION INTRAMUSCULAR at 23:57

## 2019-01-01 RX ADMIN — PIPERACILLIN SODIUM,TAZOBACTAM SODIUM 3.38 G: 3; .375 INJECTION, POWDER, FOR SOLUTION INTRAVENOUS at 00:43

## 2019-01-01 RX ADMIN — MORPHINE SULFATE 2 MG: 2 INJECTION, SOLUTION INTRAMUSCULAR; INTRAVENOUS at 08:28

## 2019-01-01 RX ADMIN — HYDROCORTISONE SODIUM SUCCINATE 200 MG: 250 INJECTION, POWDER, FOR SOLUTION INTRAMUSCULAR; INTRAVENOUS at 05:23

## 2019-01-01 RX ADMIN — HEPARIN SODIUM 5000 UNITS: 5000 INJECTION, SOLUTION INTRAVENOUS; SUBCUTANEOUS at 05:24

## 2019-01-01 RX ADMIN — HYDROCORTISONE SODIUM SUCCINATE 200 MG: 250 INJECTION, POWDER, FOR SOLUTION INTRAMUSCULAR; INTRAVENOUS at 23:32

## 2019-01-01 RX ADMIN — MORPHINE SULFATE 6 MG: 100 SOLUTION ORAL at 14:08

## 2019-01-01 RX ADMIN — HALOPERIDOL LACTATE 2 MG: 5 INJECTION, SOLUTION INTRAMUSCULAR at 15:10

## 2019-01-01 RX ADMIN — METHYLPREDNISOLONE SODIUM SUCCINATE 40 MG: 40 INJECTION, POWDER, FOR SOLUTION INTRAMUSCULAR; INTRAVENOUS at 04:29

## 2019-01-01 RX ADMIN — ACETAZOLAMIDE 250 MG: 500 INJECTION, POWDER, LYOPHILIZED, FOR SOLUTION INTRAVENOUS at 12:05

## 2019-01-01 RX ADMIN — POTASSIUM CHLORIDE 10 MEQ: 10 INJECTION, SOLUTION INTRAVENOUS at 11:09

## 2019-01-01 RX ADMIN — ATENOLOL 25 MG: 25 TABLET ORAL at 18:37

## 2019-01-01 RX ADMIN — POTASSIUM CHLORIDE 10 MEQ: 10 INJECTION, SOLUTION INTRAVENOUS at 11:00

## 2019-01-01 RX ADMIN — HALOPERIDOL LACTATE 2 MG: 5 INJECTION, SOLUTION INTRAMUSCULAR at 18:21

## 2019-01-01 RX ADMIN — METHYLPREDNISOLONE SODIUM SUCCINATE 40 MG: 40 INJECTION, POWDER, FOR SOLUTION INTRAMUSCULAR; INTRAVENOUS at 21:40

## 2019-01-01 RX ADMIN — PIPERACILLIN SODIUM,TAZOBACTAM SODIUM 3.38 G: 3; .375 INJECTION, POWDER, FOR SOLUTION INTRAVENOUS at 17:35

## 2019-01-01 RX ADMIN — HYDRALAZINE HYDROCHLORIDE 10 MG: 20 INJECTION INTRAMUSCULAR; INTRAVENOUS at 21:15

## 2019-01-01 RX ADMIN — FUROSEMIDE 20 MG: 10 INJECTION, SOLUTION INTRAMUSCULAR; INTRAVENOUS at 09:03

## 2019-01-01 RX ADMIN — MORPHINE SULFATE 2 MG: 2 INJECTION, SOLUTION INTRAMUSCULAR; INTRAVENOUS at 16:49

## 2019-01-01 RX ADMIN — POTASSIUM CHLORIDE 10 MEQ: 10 INJECTION, SOLUTION INTRAVENOUS at 07:08

## 2019-01-01 RX ADMIN — FAMOTIDINE 20 MG: 10 INJECTION, SOLUTION INTRAVENOUS at 16:25

## 2019-01-01 RX ADMIN — HALOPERIDOL LACTATE 2 MG: 5 INJECTION, SOLUTION INTRAMUSCULAR at 22:01

## 2019-01-01 RX ADMIN — POTASSIUM CHLORIDE 10 MEQ: 10 INJECTION, SOLUTION INTRAVENOUS at 08:30

## 2019-01-01 RX ADMIN — DOXYCYCLINE 100 MG: 100 INJECTION, POWDER, LYOPHILIZED, FOR SOLUTION INTRAVENOUS at 04:34

## 2019-01-01 RX ADMIN — POTASSIUM CHLORIDE 10 MEQ: 10 INJECTION, SOLUTION INTRAVENOUS at 20:09

## 2019-01-01 RX ADMIN — FUROSEMIDE 20 MG: 10 INJECTION, SOLUTION INTRAMUSCULAR; INTRAVENOUS at 09:30

## 2019-01-01 RX ADMIN — IPRATROPIUM BROMIDE AND ALBUTEROL SULFATE 3 ML: .5; 3 SOLUTION RESPIRATORY (INHALATION) at 20:31

## 2019-01-01 RX ADMIN — HYDRALAZINE HYDROCHLORIDE 10 MG: 20 INJECTION INTRAMUSCULAR; INTRAVENOUS at 02:21

## 2019-01-01 RX ADMIN — PIPERACILLIN SODIUM,TAZOBACTAM SODIUM 3.38 G: 3; .375 INJECTION, POWDER, FOR SOLUTION INTRAVENOUS at 00:44

## 2019-01-01 RX ADMIN — POTASSIUM CHLORIDE 10 MEQ: 10 INJECTION, SOLUTION INTRAVENOUS at 21:19

## 2019-01-01 RX ADMIN — IPRATROPIUM BROMIDE AND ALBUTEROL SULFATE 3 ML: .5; 3 SOLUTION RESPIRATORY (INHALATION) at 20:40

## 2019-01-01 RX ADMIN — METHYLPREDNISOLONE SODIUM SUCCINATE 40 MG: 40 INJECTION, POWDER, FOR SOLUTION INTRAMUSCULAR; INTRAVENOUS at 13:14

## 2019-01-01 RX ADMIN — IPRATROPIUM BROMIDE AND ALBUTEROL SULFATE 3 ML: .5; 3 SOLUTION RESPIRATORY (INHALATION) at 07:11

## 2019-01-01 RX ADMIN — IPRATROPIUM BROMIDE AND ALBUTEROL SULFATE 3 ML: .5; 3 SOLUTION RESPIRATORY (INHALATION) at 16:00

## 2019-01-01 RX ADMIN — HEPARIN SODIUM 5000 UNITS: 5000 INJECTION, SOLUTION INTRAVENOUS; SUBCUTANEOUS at 20:11

## 2019-01-01 RX ADMIN — DOXYCYCLINE 100 MG: 100 INJECTION, POWDER, LYOPHILIZED, FOR SOLUTION INTRAVENOUS at 16:25

## 2019-01-01 RX ADMIN — PIPERACILLIN SODIUM,TAZOBACTAM SODIUM 3.38 G: 3; .375 INJECTION, POWDER, FOR SOLUTION INTRAVENOUS at 08:34

## 2019-01-01 RX ADMIN — ATENOLOL 25 MG: 25 TABLET ORAL at 17:27

## 2019-01-01 RX ADMIN — BUDESONIDE 500 MCG: 0.5 INHALANT RESPIRATORY (INHALATION) at 20:22

## 2019-01-01 RX ADMIN — FAMOTIDINE 20 MG: 10 INJECTION, SOLUTION INTRAVENOUS at 17:37

## 2019-01-01 RX ADMIN — DOXYCYCLINE 100 MG: 100 INJECTION, POWDER, LYOPHILIZED, FOR SOLUTION INTRAVENOUS at 05:25

## 2019-01-01 RX ADMIN — DIPHENHYDRAMINE HYDROCHLORIDE 50 MG: 50 INJECTION, SOLUTION INTRAMUSCULAR; INTRAVENOUS at 12:06

## 2019-01-01 RX ADMIN — METHYLPREDNISOLONE SODIUM SUCCINATE 40 MG: 40 INJECTION, POWDER, FOR SOLUTION INTRAMUSCULAR; INTRAVENOUS at 04:01

## 2019-01-01 RX ADMIN — VANCOMYCIN HYDROCHLORIDE 750 MG: 750 INJECTION, POWDER, LYOPHILIZED, FOR SOLUTION INTRAVENOUS at 17:56

## 2019-01-01 RX ADMIN — HYDRALAZINE HYDROCHLORIDE 10 MG: 20 INJECTION INTRAMUSCULAR; INTRAVENOUS at 08:24

## 2019-01-01 RX ADMIN — IPRATROPIUM BROMIDE AND ALBUTEROL SULFATE 3 ML: .5; 3 SOLUTION RESPIRATORY (INHALATION) at 12:05

## 2019-01-01 RX ADMIN — WATER 250 MG: 1 INJECTION INTRAMUSCULAR; INTRAVENOUS; SUBCUTANEOUS at 13:00

## 2019-01-01 RX ADMIN — ALBUMIN (HUMAN) 12.5 G: 0.25 INJECTION, SOLUTION INTRAVENOUS at 09:43

## 2019-01-01 RX ADMIN — PIPERACILLIN SODIUM,TAZOBACTAM SODIUM 3.38 G: 3; .375 INJECTION, POWDER, FOR SOLUTION INTRAVENOUS at 15:34

## 2019-09-12 PROBLEM — Z99.89 BIPAP (BIPHASIC POSITIVE AIRWAY PRESSURE) DEPENDENCE: Status: ACTIVE | Noted: 2019-01-01

## 2019-09-12 PROBLEM — I10 HYPERTENSION: Status: ACTIVE | Noted: 2019-01-01

## 2019-09-12 PROBLEM — J96.01 ACUTE RESPIRATORY FAILURE WITH HYPOXIA AND HYPERCAPNIA (HCC): Status: ACTIVE | Noted: 2019-01-01

## 2019-09-12 PROBLEM — J96.21 ACUTE ON CHRONIC RESPIRATORY FAILURE WITH HYPOXIA AND HYPERCAPNIA (HCC): Status: ACTIVE | Noted: 2019-01-01

## 2019-09-12 PROBLEM — J96.22 ACUTE ON CHRONIC RESPIRATORY FAILURE WITH HYPOXIA AND HYPERCAPNIA (HCC): Status: ACTIVE | Noted: 2019-01-01

## 2019-09-12 PROBLEM — D69.6 THROMBOCYTOPENIA (HCC): Status: ACTIVE | Noted: 2019-01-01

## 2019-09-12 PROBLEM — J96.02 ACUTE RESPIRATORY FAILURE WITH HYPOXIA AND HYPERCAPNIA (HCC): Status: ACTIVE | Noted: 2019-01-01

## 2019-09-12 PROBLEM — I73.81 ERYTHROMELALGIA (HCC): Status: ACTIVE | Noted: 2019-01-01

## 2019-09-12 PROBLEM — E43 SEVERE PROTEIN-CALORIE MALNUTRITION (HCC): Status: ACTIVE | Noted: 2019-01-01

## 2019-09-12 PROBLEM — J44.1 COPD EXACERBATION (HCC): Status: ACTIVE | Noted: 2019-01-01

## 2019-09-12 NOTE — ED TRIAGE NOTES
Pt arrives per EMS from [facility] w/ respiratory distress. Nursing called EMS d/t noticed SOB, ems reports pt o2 63% upon arrival; placed on CPAP and o2 returned to 97%. Pt ANOx4 and able to nod appropriately and speaking some words during triage.

## 2019-09-12 NOTE — CONSULTS
[facility]ists  Pulmonary, Critical Care, and Sleep Medicine    Name: Milena Chavis MRN: [id number]   : 1940 Hospital: [facility] [place]    Date: 2019  Room: [facility]/[address] [place] [address] Note                                              Consult requesting physician: Dr. Arabella Sanchez  Reason for Consult: respiratory failure, pneumonia, ? Lung mass, CHF    Subjective/History of Present Illness:     Patient is a 78 y.o. female with PMHx significant for COPD recently  placed on BIPAP and oxygen ( 5L and BIPAP at [facility] to rehab) CHF ( diastolic dysfunction / ) recent pneumonia/dysphagia/chornicn edema of lower extremities ( 3 years)  She stopped smoking about 3 weeks ago, heavy smoker for over 50 years, no pulmonary follow up until last hospitalization. At rehab patient was taking her bronchodilators but was refusing NIV. At rehab she was noticed to have saturation 63%   Today sob/cough no choking episodes. No chest pain. Previous PVL was negative. 2019:  BIPAP  abg follow up in 2-3 hrs   bronchodilators   Steroids abx  Gentle diuresis mixed pattern might need diamox tomorrow  if bicarbonate worse  Echo  Trop  pvl  CTA when more stable r/o lung mass  I had discussion with patient  and       I/O last 24 hrs: No intake or output data in the 24 hours ending 19 1533      The patient is critically ill and can not provide additional history due to  Respiratory failure     History [facility] from  Family and [facility]     Review of Systems:  A comprehensive review of systems was negative except for that written in the HPI.        Review of Systems:   HEENT: No epistaxis, no nasal drainage, no difficulty in swallowing, no redness in eyes  Respiratory: as above  Cardiovascular: no chest pain, no palpitations, no chronic leg edema, no syncope  Gastrointestinal: no abd pain, no vomiting, no diarrhea, no bleeding symptoms  Genitourinary: No urinary symptoms or hematuria  Musculoskeletal:Neg  Neurological: No focal weakness, no seizures, no headaches  Behvioral/Psych: No anxiety, no depression  Constitutional: No fever, no chills, no weight loss, no night sweats     Allergies   Allergen Reactions    Iodine Hives      Past Medical History:   Diagnosis Date    Chronic kidney disease (CKD)     COPD (chronic obstructive pulmonary disease) (HCC)     Erythromelalgia (HCC)     Gait abnormality     Hypertension     Hypoxia     Muscle weakness (generalized)     Nicotine dependence     Osteoporosis     Respiratory failure (HCC)     Vitamin D deficiency       History reviewed. No pertinent surgical history. Social History     Tobacco Use    Smoking status: Former Smoker    Smokeless tobacco: Never Used   Substance Use Topics    Alcohol use: Not on file      History reviewed. No pertinent family history.    Prior to Admission medications    Not on File     Current Facility-Administered Medications   Medication Dose Route Frequency    piperacillin-tazobactam (ZOSYN) 3.375 g in 0.9% sodium chloride (MBP/ADV) 100 mL MBP  3.375 g IntraVENous Q6H    vancomycin (VANCOCIN) 1250 mg in  ml infusion  1,250 mg IntraVENous ONCE    [START ON 2019] vancomycin (VANCOCIN) 750 mg in 0.9% sodium chloride 250 mL (VIAL-MATE)  750 mg IntraVENous Q24H    Vancomycin-Rx to Dose & Monitor  1 Each Other Rx Dosing/Monitoring    albuterol-ipratropium (DUO-NEB) 2.5 MG-0.5 MG/3 ML  3 mL Nebulization QID RT    budesonide (PULMICORT) 500 mcg/2 ml nebulizer suspension  500 mcg Nebulization BID RT    furosemide (LASIX) injection 20 mg  20 mg IntraVENous BID         Objective:   Vital Signs:    Visit Vitals  BP (!) 129/95 (BP 1 Location: Right arm, BP Patient Position: At rest)   Pulse 78   Temp 97.1 °F (36.2 °C)   Resp 20   Ht 4' 11\" (1.499 m)   Wt 48.1 kg (106 lb)   SpO2 97%   BMI 21.41 kg/m²       O2 Device: BIPAP       Temp (24hrs), Av.1 °F (36.2 °C), Min:97.1 °F (36.2 °C), Max:97.1 °F (36.2 °C)       Intake/Output:   Last shift:      No intake/output data recorded. Last 3 shifts: No intake/output data recorded. No intake or output data in the 24 hours ending 09/12/19 1533    Last 3 Recorded Weights in this Encounter    09/12/19 1407   Weight: 48.1 kg (106 lb)       BIPAP 16/7 fio2 35 %    Recent Labs     09/12/19  1452 09/12/19  1440   PHI 7.402  --    PCO2I 81.2*  --    PO2I 59*  --    HCO3I 50.6*  --    FIO2I 28 28       Physical Exam:     General/Neurology: Alert, Awake in moderate respiratory distress    Head:   Normocephalic, without obvious abnormality, atraumatic. Eye:   EOM intact  no scleral icterus, no pallor, no cyanosis. Nose:   No sinus tenderness  Throat:  Lips, mucosa, and tongue normal. No oral thrush. Neck:   Supple, symmetric. No lymphadenopathy. Trachea midline  Lung:   Bilateral crckles mild rhonchi   Heart:   Regular rate & rhythm. S1 S2 present. No murmur. No JVD. Abdomen:  Soft. NT. ND. +BS. No masses. Extremities:  Bilateral 3 plus edema . No cyanosis. No clubbing. Pulses: weak present   Lymphatic:  No cervical or supraclavicular palpable lymphadenopathy. Musculoskeletal: No joint swelling. No tenderness.    Skin:    red tender lwoer extremites       Data:       Recent Results (from the past 24 hour(s))   EKG, 12 LEAD, INITIAL    Collection Time: 09/12/19  2:00 PM   Result Value Ref Range    Ventricular Rate 76 BPM    Atrial Rate 76 BPM    P-R Interval 142 ms    QRS Duration 68 ms    Q-T Interval 360 ms    QTC Calculation (Bezet) 405 ms    Calculated P Axis 86 degrees    Calculated R Axis 87 degrees    Calculated T Axis 56 degrees    Diagnosis       Normal sinus rhythm  Normal ECG  No previous ECGs available     CBC WITH AUTOMATED DIFF    Collection Time: 09/12/19  2:10 PM   Result Value Ref Range    WBC 3.4 (L) 4.6 - 13.2 K/uL    RBC 4.59 4.20 - 5.30 M/uL    HGB 13.3 12.0 - 16.0 g/dL    HCT 45.6 (H) 35.0 - 45.0 %    MCV 99.3 (H) 74.0 - 97.0 FL MCH 29.0 24.0 - 34.0 PG    MCHC 29.2 (L) 31.0 - 37.0 g/dL    RDW 14.2 11.6 - 14.5 %    PLATELET 83 (L) 819 - 420 K/uL    MPV 11.2 9.2 - 11.8 FL    NEUTROPHILS 71 40 - 73 %    LYMPHOCYTES 22 21 - 52 %    MONOCYTES 6 3 - 10 %    EOSINOPHILS 1 0 - 5 %    BASOPHILS 0 0 - 2 %    ABS. NEUTROPHILS 2.5 1.8 - 8.0 K/UL    ABS. LYMPHOCYTES 0.7 (L) 0.9 - 3.6 K/UL    ABS. MONOCYTES 0.2 0.05 - 1.2 K/UL    ABS. EOSINOPHILS 0.0 0.0 - 0.4 K/UL    ABS. BASOPHILS 0.0 0.0 - 0.1 K/UL    PLATELET COMMENTS Platelet Estimate, Decreased      RBC COMMENTS POIKILOCYTOSIS  SLIGHT        DF AUTOMATED     METABOLIC PANEL, COMPREHENSIVE    Collection Time: 09/12/19  2:10 PM   Result Value Ref Range    Sodium 140 136 - 145 mmol/L    Potassium 3.9 3.5 - 5.5 mmol/L    Chloride 89 (L) 100 - 111 mmol/L    CO2 >45 (HH) 21 - 32 mmol/L    Anion gap Cannot be calculated 3.0 - 18 mmol/L    Glucose 95 74 - 99 mg/dL    BUN 20 (H) 7.0 - 18 MG/DL    Creatinine 0.88 0.6 - 1.3 MG/DL    BUN/Creatinine ratio 23 (H) 12 - 20      GFR est AA >60 >60 ml/min/1.73m2    GFR est non-AA >60 >60 ml/min/1.73m2    Calcium 8.8 8.5 - 10.1 MG/DL    Bilirubin, total 0.8 0.2 - 1.0 MG/DL    ALT (SGPT) 35 13 - 56 U/L    AST (SGOT) 21 10 - 38 U/L    Alk.  phosphatase 108 45 - 117 U/L    Protein, total 6.2 (L) 6.4 - 8.2 g/dL    Albumin 3.6 3.4 - 5.0 g/dL    Globulin 2.6 2.0 - 4.0 g/dL    A-G Ratio 1.4 0.8 - 1.7     CARDIAC PANEL,(CK, CKMB & TROPONIN)    Collection Time: 09/12/19  2:10 PM   Result Value Ref Range    CK 30 26 - 192 U/L    CK - MB 3.0 <3.6 ng/ml    CK-MB Index 10.0 (H) 0.0 - 4.0 %    Troponin-I, QT <0.02 0.0 - 0.045 NG/ML   NT-PRO BNP    Collection Time: 09/12/19  2:10 PM   Result Value Ref Range    NT pro-BNP 5,425 (H) 0 - 1,800 PG/ML   POC LACTIC ACID    Collection Time: 09/12/19  2:19 PM   Result Value Ref Range    Lactic Acid (POC) 1.49 0.40 - 2.00 mmol/L   POC VENOUS BLOOD GAS    Collection Time: 09/12/19  2:40 PM   Result Value Ref Range    Device: BIPAP FIO2 (POC) 28 %    pH, venous (POC) 7.391 7.32 - 7.42      pCO2, venous (POC) 84.1 (H) 41 - 51 MMHG    pO2, venous (POC) 38 25 - 40 mmHg    HCO3, venous (POC) 51.0 (H) 23.0 - 28.0 MMOL/L    sO2, venous (POC) 67 65 - 88 %    Base excess, venous (POC) 26 mmol/L    PEEP/CPAP (POC) 6 cmH2O    PIP (POC) 14      Allens test (POC) YES      Total resp. rate 20      Site RIGHT RADIAL      Specimen type (POC) VENOUS BLOOD      Performed by [employer]     Spontaneous timed YES     POC G3    Collection Time: 09/12/19  2:52 PM   Result Value Ref Range    Device: BIPAP      FIO2 (POC) 28 %    pH (POC) 7.402 7.35 - 7.45      pCO2 (POC) 81.2 (H) 35.0 - 45.0 MMHG    pO2 (POC) 59 (L) 80 - 100 MMHG    HCO3 (POC) 50.6 (H) 22 - 26 MMOL/L    sO2 (POC) 88 (L) 92 - 97 %    Base excess (POC) 26 mmol/L    PEEP/CPAP (POC) 6 cmH2O    PIP (POC) 14      Allens test (POC) YES      Total resp. rate 30      Site LEFT RADIAL      Specimen type (POC) ARTERIAL      Performed by [employer]     Spontaneous timed YES           Chemistry Recent Labs     09/12/19  1410   GLU 95      K 3.9   CL 89*   CO2 >45*   BUN 20*   CREA 0.88   CA 8.8   AGAP Cannot be calculated   BUCR 23*      TP 6.2*   ALB 3.6   GLOB 2.6   AGRAT 1.4        Lactic Acid No results found for: LAC  No results for input(s): LAC in the last 72 hours. Liver Enzymes Protein, total   Date Value Ref Range Status   09/12/2019 6.2 (L) 6.4 - 8.2 g/dL Final     Albumin   Date Value Ref Range Status   09/12/2019 3.6 3.4 - 5.0 g/dL Final     Globulin   Date Value Ref Range Status   09/12/2019 2.6 2.0 - 4.0 g/dL Final     A-G Ratio   Date Value Ref Range Status   09/12/2019 1.4 0.8 - 1.7   Final     AST (SGOT)   Date Value Ref Range Status   09/12/2019 21 10 - 38 U/L Final     Alk.  phosphatase   Date Value Ref Range Status   09/12/2019 108 45 - 117 U/L Final     Recent Labs     09/12/19  1410   TP 6.2*   ALB 3.6   GLOB 2.6   AGRAT 1.4   SGOT 21           CBC w/Diff Recent Labs     09/12/19  1410   WBC 3.4*   RBC 4.59   HGB 13.3   HCT 45.6*   PLT 83*   GRANS 71   LYMPH 22   EOS 1        Cardiac Enzymes Lab Results   Component Value Date/Time    CPK 30 09/12/2019 02:10 PM    CKMB 3.0 09/12/2019 02:10 PM    CKND1 10.0 (H) 09/12/2019 02:10 PM    TROIQ <0.02 09/12/2019 02:10 PM        BNP No results found for: BNP, BNPP, XBNPT     Coagulation No results for input(s): PTP, INR, APTT in the last 72 hours. No lab exists for component: INREXT      Thyroid  No results found for: T4, T3U, TSH, TSHEXT    No results found for: T4     Urinalysis No results found for: COLOR, APPRN, SPGRU, REFSG, TRISTON, PROTU, GLUCU, KETU, BILU, UROU, BRAXTON, LEUKU, GLUKE, EPSU, BACTU, WBCU, RBCU, CASTS, UCRY     Micro  No results for input(s): SDES, CULT in the last 72 hours. No results for input(s): CULT in the last 72 hours. Culture data during this hospitalization. All Micro Results     None               PFT       Ultrasound       LE Doppler       ECHO       Images report reviewed by me:  CT (Most Recent) (CT chest reviewed by me) No results found for this or any previous visit. CXR reviewed by me:  XR (Most Recent). CXR  reviewed by me and compared with previous CXR Results from Hospital Encounter encounter on 09/12/19   XR CHEST PORT    Narrative EXAM: CHEST RADIOGRAPH, SINGLE VIEW    CLINICAL INDICATION/HISTORY: Shortness of breath    COMPARISON: None. TECHNIQUE: Portable frontal view of the chest was obtained.     _______________    FINDINGS:    SUPPORT DEVICES: None. HEART AND MEDIASTINUM: Cardiomediastinal silhouette appears within normal  limits. Normal caliber thoracic aorta. No central vascular congestion. Asymmetric right hilar convexity    LUNGS AND PLEURAL SPACES: Small, right greater than left pleural effusions, each  with adjacent basilar atelectasis. Asymmetric, right greater than left apical  capping is also present.   Rounded opacity is seen within the inferior aspect of  the right lower lobe. There is also asymmetric pleural-based wedge-shaped  opacity within the lateral aspect of the right upper lobe. BONY THORAX AND SOFT TISSUES: No acute osseous abnormality. Moderate  degenerative changes of right and left shoulders with left-sided rotator cuff  arthropathy. _______________      Impression IMPRESSION:    Small, right greater than left pleural effusions with masslike opacity within  inferior right lower lobe and lateral right upper lobe. Asymmetric right hilar  convexity, concerning for lymphadenopathy. Recommend chest CT to evaluate  cluster of findings which are concerning for malignancy. IMPRESSION:   · Acute on chronic  hypercarbic and hypoxemic respiratory failure- COPd exacerbation. I have reviewed data and she had been hospitalized . Non complaint with NIV on 5L at [facility] . Resume NIV . Copd tx   · Acute on chronic  CHF- ? Diastolic dysfunction - not clear worse now patient was on diuretics and had normal EF. Low albumins , chronic  edema. Gentle diuresis if bicarbonate worse might need Diamox. echo trop ecg   · Pneumonia ? HAP recently NH/hospital or aspiration van zosyn doxy . Urine strep leg  · Possible lung mass once more stable CTa for both Pe and lung mass   Patient Active Problem List   Diagnosis Code    BiPAP (biphasic positive airway pressure) dependence Z99.89    Acute respiratory failure with hypoxia and hypercapnia (HCC) J96.01, J96.02 ·           · Code status: I had a discussion about code status full code       RECOMMENDATIONS:   Respiratory:  BIPAP 16/7 fio2 30   bronchodilators  abg   Gentle dieresis  abx   Once  more stable CTA. Ok to give full dose lovenox in a meantime   Now PVL less likely PE more aspiration pneumonia vs mass   Protecting airway but might require intubation   Keep SPO2 >=92%. HOB 30 degree elevation all the time. Aggressive pulmonary toileting. Aspiration precautions. Incentive spirometry.   CVS: ? diastolic CHf ? BNP high r/o PE as well less  likely . Had normal echo 1 month ago. Trop ecg   ID: pneumonia aspiration vs HAP  Latih taylorsyn doxy   Sepsis bundle and protocol followed. Follow serial lactic acid, frequent BMP check, fluid resuscitation. Follow cultures. Deescalate antibiotic when appropriate. Hematology/Oncology: follow wbc   Renal: daily high bicabonate if higher hold lasix and give diamox   GI/: ppi   Endocrine: Monitor BS. SSI. NPain/Sedation: prn   Skin/Wound: legs swollen tender erythema   Electrolytes: Replace electrolytes per ICU electrolyte replacement protocol. IVF: kvo  Nutrition: hold today   Prophylaxis: DVT Prophylaxis:  Full dose lovenox  GI Prophylaxis:   patel  Advance Directive/Palliative Care: consulted    Will defer respective systems problem management to primary and other respective consultant and follow patient in ICU with primary and other medical team.  Further recommendations will be based on the patient's response to recommended treatment and results of the investigation ordered. Quality Care: PPI, DVT prophylaxis, HOB elevated, Infection control all reviewed and addressed. Care of plan d/w RN, RT, MDR.  D/w patient and family     Moderate complexity decision making was performed during the evaluation of this patient at high risk for decompensation with multiple organ involvement. Total critical care time spent rendering care exclusive of procedures: 60  minutes.          Vicki Alvarado MD  9/12/2019

## 2019-09-12 NOTE — PROGRESS NOTES
Patient has iodine allergy, unable to do cta until prednisone and hospital policy for contrast followed. Patient has ordered protocol of  Prednisone,/benadryl in place  by Dr. Moe Mix per protocol,patient must have x3 doses of 50mg of prednisone,1 to be given 13 hr prior to cta,1 given 6 hr prior to cta, and 1 50mg prednisone to be given 1 hr prior to CTA along with 50mg benadryl. Patient unable to get CTA at this time, will be tomorrow d/t protocol and iodine allergy. Patient in no distress at this time, comfortable on BIpap, call bell in reach, patient denies any pain.

## 2019-09-12 NOTE — PROGRESS NOTES
Pharmacy Dosing Services: Renal    Pharmacist Renal Dosing Progress Note for Pepcid   Physician Dr. Soren Alberto    The following medication: Pepcid was automatically dose-adjusted per [facility] P&T Committee Protocol, with respect to renal function. Pt Weight:   Wt Readings from Last 1 Encounters:   09/12/19 48.1 kg (106 lb)         Previous Regimen Pepcid 20mg IV q12h   Serum Creatinine Lab Results   Component Value Date/Time    Creatinine 0.88 09/12/2019 02:10 PM         Creatinine Clearance Estimated Creatinine Clearance: 38.1 mL/min (based on SCr of 0.88 mg/dL). BUN Lab Results   Component Value Date/Time    BUN 20 (H) 09/12/2019 02:10 PM         Dosage changed to:  Pepcid 20mg IV q24h    Pharmacy to continue to monitor patient daily. Will make dosage adjustments based upon changing renal function.   OSCAR Luque Contact information: [phone]

## 2019-09-12 NOTE — PROGRESS NOTES
Spoke with KATHY Malloy @ 1347. PT with Iodine allergy; unable to give contrast. RN will notify MD.    Robert Herndon with KATHY Malloy @ 4719. Per RN, MD stated PT received 125mg solumedrol @ 1300, and has orders for 50mg benadryl and 50mg prednisone. RN informed of policy for contrast allergies and MD insisted it was okay to give contrast.    Spoke with Dr. Christin Scanlon @ 6438; per radiologist, solumedrol is not a substitute for prednisone and hospital policy for contrast allergy must be followed. RN and MD aware.

## 2019-09-12 NOTE — ROUTINE PROCESS
TRANSFER - OUT REPORT:    Verbal report given to KATHY Malloy(name) on Adina Jamison  being transferred to ICU(unit) for routine progression of care       Report consisted of patients Situation, Background, Assessment and   Recommendations(SBAR). Information from the following report(s) SBAR, ED Summary, MAR, Recent Results and Cardiac Rhythm NSR was reviewed with the receiving nurse. Lines:   Peripheral IV 09/12/19 Right Antecubital (Active)   Site Assessment Clean, dry, & intact 9/12/2019  2:10 PM   Phlebitis Assessment 0 9/12/2019  2:10 PM   Infiltration Assessment 0 9/12/2019  2:10 PM   Dressing Status Clean, dry, & intact 9/12/2019  2:10 PM   Dressing Type Transparent 9/12/2019  2:10 PM   Hub Color/Line Status Patent; Flushed 9/12/2019  2:10 PM   Action Taken Blood drawn 9/12/2019  2:10 PM        Opportunity for questions and clarification was provided.       Patient transported with:   O2 @ BIPAP liters + RN + RT

## 2019-09-12 NOTE — H&P
History & Physical    Patient: Nemo Donato MRN: [id number]  CSN: [id number]    YOB: 1940  Age: 78 y.o. Sex: female      DOA: 9/12/2019  Primary Care Provider:  Sujatha Ariza MD      Assessment/Plan     Hospital Problems  Never Reviewed          Codes Class Noted POA    BiPAP (biphasic positive airway pressure) dependence ICD-10-CM: Z99.89  ICD-9-CM: V46.8  9/12/2019 Unknown        Acute on chronic respiratory failure with hypoxia and hypercapnia (HCC) ICD-10-CM: J96.21, J96.22  ICD-9-CM: 518.84, 786.09, 799.02  9/12/2019         COPD exacerbation ([facility] ICD-10-CM: J44.1  ICD-9-CM: 491.21  9/12/2019 Unknown        Hypertension ICD-10-CM: I10  ICD-9-CM: 401.9  9/12/2019 Unknown        Severe protein-calorie malnutrition ([facility] ICD-10-CM: E43  ICD-9-CM: 262  9/12/2019 Unknown        Thrombocytopenia ([facility] ICD-10-CM: D69.6  ICD-9-CM: 287.5  9/12/2019 Unknown        Erythromelalgia ([facility] ICD-10-CM: I73.81  ICD-9-CM: 443.82  9/12/2019 Unknown                Admit to ICU    CRITICAL CARE PLAN    Resp -  Acute on chronic respiratory failure with hypoxia and hypercapnia   On bipap now, low threshold to be intubated    HOB>30 degrees. Bronchodilators. Follow sputum cx. Cxr:Small, right greater than left pleural effusions with masslike opacity within  inferior right lower lobe and lateral right upper lobe   Effusion   Will have cta r/o pe, see the mass     Copd exacerbation  Iv steroid, breathing tx    Will continue vanc /zosyn. doxy for possible hcap       Cvs:   Pleural effusion-to see heart function   Will give lasix   Echo  pvl for bilateral leg swelling       htn : continue home medication, hydralazine prn     ID - Follow up blood and urine cx. Sputum cx strep pneum, legionella   ANTIBIOTICS . Trend temps, wbc, LA improving. Heme/onc - Follow H&H, plts. INR.   Thrombocytopenia : was 68 on 8/29/19 from [place] record       Renal - Trend BUN, Cr, follow I/O,  Check and replace Mg, K, phos.repalced per icu protocol     Endocrine -  Follow FSG, ssi for     Neuro: alert and oriented     GI - NPO for now. Due to on bipap   Severe mild nutrition     Derm :erythromelalgia   Prophylaxis -lovenox         AC:  I have had a discussion with patient and family regarding code status. Particularly, described potential options in event of cardiac or respiratory arrest. I have explained what being full code entails, including cardiorespiratory resuscitation attempts with chest compressions, potential cariodioversion/ \" shocks\" as well as intubation. I have also explained Do not resuscitate which would mean we allow a natural death with out aggressive interventions. Full code AC 32 min     60 minutes of critical care time spent in the direct evaluation and treatment of this high risk patient. The reason for providing this level of medical care for this critically ill patient was due a critical illness that impaired one or more vital organ systems such that there was a high probability of imminent or life threatening deterioration in the patients condition. This care involved high complexity decision making to assess, manipulate, and support vital system functions, to treat this degreee vital organ system failure and to prevent further life threatening deterioration of the patients condition. Estimate  length of stay : 2-3 day    CC: sob        HPI:     Salty Eldridge is a 78 y.o. female who hx copd on bipap at night, O2 dependent ,htn was sent to ER from SNF due to sob and desat. She was found O2 63% per EMS, sat O2 better after breathing tx and iv steroid administrated. She was found labor breathing on arrival,bipap was put on, abg 7.4, pCO2 81, PO2 59 while on bipap. cxr :Small, right greater than left pleural effusions with masslike opacity within  inferior right lower lobe and lateral right upper lobe. Daughter and other family were at the bedside.  Daughter reported that pt was d/c from [place] recently and d/c to rehab for one week. She refused to be put on \"machine-cpap\" at night. No fever/chills reported. Visit Vitals  BP (!) 126/38 (BP 1 Location: Left arm, BP Patient Position: At rest)   Pulse 72   Temp 97.3 °F (36.3 °C)   Resp 19   Ht 4' 11\" (1.499 m)   Wt 48.1 kg (106 lb)   SpO2 98%   BMI 21.41 kg/m²      O2 Device: BIPAP      Past Medical History:   Diagnosis Date    Chronic kidney disease (CKD)     COPD (chronic obstructive pulmonary disease) (HCC)     Erythromelalgia (HCC)     Gait abnormality     Hypertension     Hypoxia     Muscle weakness (generalized)     Nicotine dependence     Osteoporosis     Respiratory failure (HCC)     Vitamin D deficiency      Surgical History      Surgery Date Site/Laterality Comments   CATARACT EXTRACTION   Bilateral      FINGER SURGERY   Left        Family History      Medical History Relation Name Comments   Heart attack Father       Hypertension Father       Other Mother   Hodgkin's disease     Relation Name Status Comments   Father    (Age 70)     Mother    (Age 62)        Social History     Socioeconomic History    Marital status:      Spouse name: Not on file    Number of children: Not on file    Years of education: Not on file    Highest education level: Not on file   Tobacco Use    Smoking status: Former Smoker    Smokeless tobacco: Never Used   Substance and Sexual Activity    Drug use: Not Currently       Prior to Admission medications    Medication Sig Start Date End Date Taking? Authorizing Provider   fluticasone furoate-vilanterol (BREO ELLIPTA) 100-25 mcg/dose inhaler Take 1 Puff by inhalation daily. Yes Other, MD Pepe   nicotine (NICODERM CQ) 14 mg/24 hr patch 1 Patch by TransDERmal route every twenty-four (24) hours. Yes Randy, MD Pepe   predniSONE (DELTASONE) 20 mg tablet Take 20 mg by mouth Every Friday. Yes Randy, MD Pepe   furosemide (LASIX) 20 mg tablet Take 20 mg by mouth daily.    Yes Other, MD Pepe   atenolol (TENORMIN) 50 mg tablet Take  by mouth daily. Yes Other, MD Pepe   atenolol (TENORMIN) 25 mg tablet Take 25 mg by mouth every evening. Yes Other, MD Pepe   lisinopril-hydroCHLOROthiazide (PRINZIDE, ZESTORETIC) 20-12.5 mg per tablet Take 1 Tab by mouth daily. Yes Other, MD Pepe   tiotropium (SPIRIVA WITH HANDIHALER) 18 mcg inhalation capsule Take 1 Cap by inhalation daily. Yes Other, MD Pepe   raNITIdine (ZANTAC) 150 mg tablet Take 150 mg by mouth daily. Yes Other, MD Pepe   bumetanide (BUMEX) 2 mg tablet Take 2 mg by mouth two (2) times a day. Yes Other, MD Pepe       Allergies   Allergen Reactions    Iodine Hives       Review of Systems  Unable to obtain due to on bipap and in distress       Physical Exam:     Physical Exam:  Visit Vitals  BP (!) 126/38 (BP 1 Location: Left arm, BP Patient Position: At rest)   Pulse 72   Temp 97.3 °F (36.3 °C)   Resp 19   Ht 4' 11\" (1.499 m)   Wt 48.1 kg (106 lb)   SpO2 98%   BMI 21.41 kg/m²      O2 Device: BIPAP    Temp (24hrs), Av.2 °F (36.2 °C), Min:97.1 °F (36.2 °C), Max:97.3 °F (36.3 °C)     0701 -  1900  In: 50 [I.V.:50]  Out: -    No intake/output data recorded. General:  Awake, some  cooperative,in acute  Distress. Labor breathing , poor nutrition    Head:  Normocephalic, without obvious abnormality, atraumatic. Eyes:  Conjunctivae/corneas clear, sclera anicteric, PERRL, EOMs intact. Nose: Nares normal. No drainage or sinus tenderness. bipap mask noted    Throat: Lips, mucosa, and tongue normal. .   Neck: Supple, symmetrical, trachea midline, no adenopathy. Lungs:   Decreased bs bilaterally        Heart:  Regular rate and rhythm, S1, S2 normal, +murmur, click, rub or gallop. Abdomen: Soft, non-tender. Bowel sounds normal. No masses,  No organomegaly. Extremities: Extremities normal, atraumatic, no cyanosis or edema. Pulses: 2+ and symmetric all extremities.    Skin: Skin bilateral feet redness, swelling of bilateral with some oozing discharge .  Capillary refill normal    Neurologic: Alert and oriented, move all extremities   Psych agitated        Labs Reviewed:    BMP:   Lab Results   Component Value Date/Time     09/12/2019 02:10 PM    K 3.9 09/12/2019 02:10 PM    CL 89 (L) 09/12/2019 02:10 PM    CO2 >45 (HH) 09/12/2019 02:10 PM    AGAP Cannot be calculated 09/12/2019 02:10 PM    GLU 95 09/12/2019 02:10 PM    BUN 20 (H) 09/12/2019 02:10 PM    CREA 0.88 09/12/2019 02:10 PM    GFRAA >60 09/12/2019 02:10 PM    GFRNA >60 09/12/2019 02:10 PM     CMP:   Lab Results   Component Value Date/Time     09/12/2019 02:10 PM    K 3.9 09/12/2019 02:10 PM    CL 89 (L) 09/12/2019 02:10 PM    CO2 >45 (HH) 09/12/2019 02:10 PM    AGAP Cannot be calculated 09/12/2019 02:10 PM    GLU 95 09/12/2019 02:10 PM    BUN 20 (H) 09/12/2019 02:10 PM    CREA 0.88 09/12/2019 02:10 PM    GFRAA >60 09/12/2019 02:10 PM    GFRNA >60 09/12/2019 02:10 PM    CA 8.8 09/12/2019 02:10 PM    ALB 3.6 09/12/2019 02:10 PM    TP 6.2 (L) 09/12/2019 02:10 PM    GLOB 2.6 09/12/2019 02:10 PM    AGRAT 1.4 09/12/2019 02:10 PM    SGOT 21 09/12/2019 02:10 PM    ALT 35 09/12/2019 02:10 PM     CBC:   Lab Results   Component Value Date/Time    WBC 3.4 (L) 09/12/2019 02:10 PM    HGB 13.3 09/12/2019 02:10 PM    HCT 45.6 (H) 09/12/2019 02:10 PM    PLT 83 (L) 09/12/2019 02:10 PM     All Cardiac Markers in the last 24 hours:   Lab Results   Component Value Date/Time    CPK 30 09/12/2019 02:10 PM    CKMB 3.0 09/12/2019 02:10 PM    CKND1 10.0 (H) 09/12/2019 02:10 PM    TROIQ <0.02 09/12/2019 02:10 PM     Recent Glucose Results:   Lab Results   Component Value Date/Time    GLU 95 09/12/2019 02:10 PM     ABG:   Lab Results   Component Value Date/Time    PHI 7.402 09/12/2019 02:52 PM    PCO2I 81.2 (H) 09/12/2019 02:52 PM    PO2I 59 (L) 09/12/2019 02:52 PM    HCO3I 50.6 (H) 09/12/2019 02:52 PM    FIO2I 28 09/12/2019 02:52 PM     COAGS: No results found for: APTT, PTP, INR  Liver Panel:   Lab Results   Component Value Date/Time    ALB 3.6 09/12/2019 02:10 PM    TP 6.2 (L) 09/12/2019 02:10 PM    GLOB 2.6 09/12/2019 02:10 PM    AGRAT 1.4 09/12/2019 02:10 PM    SGOT 21 09/12/2019 02:10 PM    ALT 35 09/12/2019 02:10 PM     09/12/2019 02:10 PM     Pancreatic Markers: No results found for: AMYLPOCT, AML, LIPPOCT, LPSE    Procedures/imaging: see electronic medical records for all procedures/Xrays and details which were not copied into this note but were reviewed prior to creation of Cassandra Ruiz MD, Internal Medicine     CC: Emmett Hill MD

## 2019-09-12 NOTE — ED PROVIDER NOTES
EMERGENCY DEPARTMENT HISTORY AND PHYSICAL EXAM    Date: 9/12/2019  Patient Name: Corinne Patel    History of Presenting Illness     Chief Complaint   Patient presents with    Respiratory Distress         History Provided By: Patient    Chief Complaint: Respiratory distress  Duration: Couple Hours      Additional History (Context):     Corinne Patel is a 78 y.o. female with PMHX of COPD, acute respiratory failure, hypoxia, hypercapnia who presents to the emergency department C/O respiratory distress that started earlier today. Per EMS patient resides at the [facility] and was in respiratory distress when staff there noticed she was very short of breath. Patient uses CPAP at night so they placed her on CPAP which she was on when they arrived. EMS states patient is O2 was 63% despite that. They emergently placed on their CPAP and gave her 2 breathing treatments and her O2 sat increased to 95%. HPI is limited due to patient condition, but she denies any chest pain, abdominal pain or leg pain or nausea.     PCP: Temi Ribera MD    Current Facility-Administered Medications   Medication Dose Route Frequency Provider Last Rate Last Dose    piperacillin-tazobactam (ZOSYN) 3.375 g in 0.9% sodium chloride (MBP/ADV) 100 mL MBP  3.375 g IntraVENous Q6H Pancho Junior  mL/hr at 09/12/19 1534 3.375 g at 09/12/19 1534    vancomycin (VANCOCIN) 1250 mg in  ml infusion  1,250 mg IntraVENous Benedict Canela MD        [START ON 9/13/2019] vancomycin (VANCOCIN) 750 mg in 0.9% sodium chloride 250 mL (VIAL-MATE)  750 mg IntraVENous Q24H Pancho Junior MD        Vancomycin-Rx to Dose & Monitor  1 Each Other Rx Dosing/Monitoring Pancho Junior MD        albuterol-ipratropium (DUO-NEB) 2.5 MG-0.5 MG/3 ML  3 mL Nebulization QID RT Jazmin Sanchez MD        budesonide (PULMICORT) 500 mcg/2 ml nebulizer suspension  500 mcg Nebulization BID RT Jazmin Sanchez MD        furosemide (LASIX) injection 20 mg  20 mg IntraVENous BID Mike Villanueva MD           Past History     Past Medical History:  Past Medical History:   Diagnosis Date    Chronic kidney disease (CKD)     [facility]     Erythromelalgia ([facility]     Gait abnormality     Hypertension     Hypoxia     Muscle weakness (generalized)     Nicotine dependence     Osteoporosis     [facility]     Vitamin D deficiency        Past Surgical History:  History reviewed. No pertinent surgical history. Family History:  History reviewed. No pertinent family history. Social History:  Social History     Tobacco Use    Smoking status: Former Smoker    Smokeless tobacco: Never Used   Substance Use Topics    Alcohol use: Not on file    Drug use: Not Currently       Allergies: Allergies   Allergen Reactions    Iodine Hives         Review of Systems   Review of Systems   Unable to perform ROS: Acuity of condition (Able to get some review of system from patient, put on CPAP and severe respiratory distress)   Respiratory: Positive for shortness of breath. Negative for cough. Cardiovascular: Negative for chest pain. Gastrointestinal: Negative for abdominal pain and nausea. Physical Exam     Vitals:    09/12/19 1407 09/12/19 1421   BP: (!) 129/95    Pulse: 78    Resp: 20    Temp: 97.1 °F (36.2 °C)    SpO2: 97% 97%   Weight: 48.1 kg (106 lb)    Height: 4' 11\" (1.499 m)      Physical Exam   Constitutional: She is oriented to person, place, and time. She appears well-developed and well-nourished. She appears distressed. HENT:   Head: Normocephalic and atraumatic. Mouth/Throat: Oropharynx is clear and moist.   Eyes: Pupils are equal, round, and reactive to light. Conjunctivae are normal. No scleral icterus. Neck: Normal range of motion. Neck supple. Cardiovascular: Normal rate and intact distal pulses. Exam reveals no gallop and no friction rub.    Capillary refill < 3 seconds   Pulmonary/Chest: Breath sounds normal. She is in respiratory distress. She has no wheezes. She has no rales. Very tight lung sounds bilateral  Using accessory abdominal muscles  During switch to the ED BiPAP, patient speaks short sentences with respiratory distress   Abdominal: Soft. Bowel sounds are normal. She exhibits no distension. There is no tenderness. Musculoskeletal: Normal range of motion. Bilateral +2 pitting edema lower extremities  No calf tenderness   Lymphadenopathy:     She has no cervical adenopathy. Neurological: She is alert and oriented to person, place, and time. No cranial nerve deficit. Skin: Skin is warm and dry. She is not diaphoretic. Psychiatric: Thought content normal.   Nursing note and vitals reviewed. Diagnostic Study Results     Labs -     Recent Results (from the past 12 hour(s))   EKG, 12 LEAD, INITIAL    Collection Time: 09/12/19  2:00 PM   Result Value Ref Range    Ventricular Rate 76 BPM    Atrial Rate 76 BPM    P-R Interval 142 ms    QRS Duration 68 ms    Q-T Interval 360 ms    QTC Calculation (Bezet) 405 ms    Calculated P Axis 86 degrees    Calculated R Axis 87 degrees    Calculated T Axis 56 degrees    Diagnosis       Normal sinus rhythm  Normal ECG  No previous ECGs available     CBC WITH AUTOMATED DIFF    Collection Time: 09/12/19  2:10 PM   Result Value Ref Range    WBC 3.4 (L) 4.6 - 13.2 K/uL    RBC 4.59 4.20 - 5.30 M/uL    HGB 13.3 12.0 - 16.0 g/dL    HCT 45.6 (H) 35.0 - 45.0 %    MCV 99.3 (H) 74.0 - 97.0 FL    MCH 29.0 24.0 - 34.0 PG    MCHC 29.2 (L) 31.0 - 37.0 g/dL    RDW 14.2 11.6 - 14.5 %    PLATELET 83 (L) 473 - 420 K/uL    MPV 11.2 9.2 - 11.8 FL    NEUTROPHILS 71 40 - 73 %    LYMPHOCYTES 22 21 - 52 %    MONOCYTES 6 3 - 10 %    EOSINOPHILS 1 0 - 5 %    BASOPHILS 0 0 - 2 %    ABS. NEUTROPHILS 2.5 1.8 - 8.0 K/UL    ABS. LYMPHOCYTES 0.7 (L) 0.9 - 3.6 K/UL    ABS. MONOCYTES 0.2 0.05 - 1.2 K/UL    ABS. EOSINOPHILS 0.0 0.0 - 0.4 K/UL    ABS.  BASOPHILS 0.0 0.0 - 0.1 K/UL    PLATELET COMMENTS Platelet Estimate, Decreased      RBC COMMENTS POIKILOCYTOSIS  SLIGHT        DF AUTOMATED     METABOLIC PANEL, COMPREHENSIVE    Collection Time: 09/12/19  2:10 PM   Result Value Ref Range    Sodium 140 136 - 145 mmol/L    Potassium 3.9 3.5 - 5.5 mmol/L    Chloride 89 (L) 100 - 111 mmol/L    CO2 >45 (HH) 21 - 32 mmol/L    Anion gap Cannot be calculated 3.0 - 18 mmol/L    Glucose 95 74 - 99 mg/dL    BUN 20 (H) 7.0 - 18 MG/DL    Creatinine 0.88 0.6 - 1.3 MG/DL    BUN/Creatinine ratio 23 (H) 12 - 20      GFR est AA >60 >60 ml/min/1.73m2    GFR est non-AA >60 >60 ml/min/1.73m2    Calcium 8.8 8.5 - 10.1 MG/DL    Bilirubin, total 0.8 0.2 - 1.0 MG/DL    ALT (SGPT) 35 13 - 56 U/L    AST (SGOT) 21 10 - 38 U/L    Alk. phosphatase 108 45 - 117 U/L    Protein, total 6.2 (L) 6.4 - 8.2 g/dL    Albumin 3.6 3.4 - 5.0 g/dL    Globulin 2.6 2.0 - 4.0 g/dL    A-G Ratio 1.4 0.8 - 1.7     CARDIAC PANEL,(CK, CKMB & TROPONIN)    Collection Time: 09/12/19  2:10 PM   Result Value Ref Range    CK 30 26 - 192 U/L    CK - MB 3.0 <3.6 ng/ml    CK-MB Index 10.0 (H) 0.0 - 4.0 %    Troponin-I, QT <0.02 0.0 - 0.045 NG/ML   NT-PRO BNP    Collection Time: 09/12/19  2:10 PM   Result Value Ref Range    NT pro-BNP 5,425 (H) 0 - 1,800 PG/ML   POC LACTIC ACID    Collection Time: 09/12/19  2:19 PM   Result Value Ref Range    Lactic Acid (POC) 1.49 0.40 - 2.00 mmol/L   POC VENOUS BLOOD GAS    Collection Time: 09/12/19  2:40 PM   Result Value Ref Range    Device: BIPAP      FIO2 (POC) 28 %    pH, venous (POC) 7.391 7.32 - 7.42      pCO2, venous (POC) 84.1 (H) 41 - 51 MMHG    pO2, venous (POC) 38 25 - 40 mmHg    HCO3, venous (POC) 51.0 (H) 23.0 - 28.0 MMOL/L    sO2, venous (POC) 67 65 - 88 %    Base excess, venous (POC) 26 mmol/L    PEEP/CPAP (POC) 6 cmH2O    PIP (POC) 14      Allens test (POC) YES      Total resp.  rate 20      Site RIGHT RADIAL      Specimen type (POC) VENOUS BLOOD      Performed by [facility]     Spontaneous timed YES     POC G3    Collection Time: 09/12/19  2:52 PM   Result Value Ref Range    Device: BIPAP      FIO2 (POC) 28 %    pH (POC) 7.402 7.35 - 7.45      pCO2 (POC) 81.2 (H) 35.0 - 45.0 MMHG    pO2 (POC) 59 (L) 80 - 100 MMHG    HCO3 (POC) 50.6 (H) 22 - 26 MMOL/L    sO2 (POC) 88 (L) 92 - 97 %    Base excess (POC) 26 mmol/L    PEEP/CPAP (POC) 6 cmH2O    PIP (POC) 14      Allens test (POC) YES      Total resp. rate 30      Site LEFT RADIAL      Specimen type (POC) ARTERIAL      Performed by Beatris Landau     Spontaneous timed YES         Radiologic Studies -   XR CHEST PORT   Final Result   IMPRESSION:      Small, right greater than left pleural effusions with masslike opacity within   inferior right lower lobe and lateral right upper lobe. Asymmetric right hilar   convexity, concerning for lymphadenopathy. Recommend chest CT to evaluate   cluster of findings which are concerning for malignancy. CT Results  (Last 48 hours)    None        CXR Results  (Last 48 hours)               09/12/19 1446  XR CHEST PORT Final result    Impression:  IMPRESSION:       Small, right greater than left pleural effusions with masslike opacity within   inferior right lower lobe and lateral right upper lobe. Asymmetric right hilar   convexity, concerning for lymphadenopathy. Recommend chest CT to evaluate   cluster of findings which are concerning for malignancy. Narrative:  EXAM: CHEST RADIOGRAPH, SINGLE VIEW       CLINICAL INDICATION/HISTORY: Shortness of breath       COMPARISON: None. TECHNIQUE: Portable frontal view of the chest was obtained.        _______________       FINDINGS:       SUPPORT DEVICES: None. HEART AND MEDIASTINUM: Cardiomediastinal silhouette appears within normal   limits. Normal caliber thoracic aorta. No central vascular congestion.    Asymmetric right hilar convexity       LUNGS AND PLEURAL SPACES: Small, right greater than left pleural effusions, each   with adjacent basilar atelectasis. Asymmetric, right greater than left apical   capping is also present. Rounded opacity is seen within the inferior aspect of   the right lower lobe. There is also asymmetric pleural-based wedge-shaped   opacity within the lateral aspect of the right upper lobe. BONY THORAX AND SOFT TISSUES: No acute osseous abnormality. Moderate   degenerative changes of right and left shoulders with left-sided rotator cuff   arthropathy. _______________                 Medications given in the ED-  Medications   piperacillin-tazobactam (ZOSYN) 3.375 g in 0.9% sodium chloride (MBP/ADV) 100 mL MBP (3.375 g IntraVENous New Bag 9/12/19 1534)   vancomycin (VANCOCIN) 1250 mg in  ml infusion (has no administration in time range)   vancomycin (VANCOCIN) 750 mg in 0.9% sodium chloride 250 mL (VIAL-MATE) (has no administration in time range)   Vancomycin-Rx to Dose & Monitor (has no administration in time range)   albuterol-ipratropium (DUO-NEB) 2.5 MG-0.5 MG/3 ML (has no administration in time range)   budesonide (PULMICORT) 500 mcg/2 ml nebulizer suspension (has no administration in time range)   furosemide (LASIX) injection 20 mg (has no administration in time range)   albuterol-ipratropium (DUO-NEB) 2.5 MG-0.5 MG/3 ML (3 mL Nebulization Given 9/12/19 1421)   methylPREDNISolone (PF) (Solu-MEDROL) injection 125 mg (125 mg IntraVENous Given 9/12/19 1431)   magnesium sulfate 2 g/50 ml IVPB (premix or compounded) (2 g IntraVENous New Bag 9/12/19 1431)         Medical Decision Making   I am the first provider for this patient. I reviewed the vital signs, available nursing notes, past medical history, past surgical history, family history and social history. Vital Signs-Reviewed the patient's vital signs.     Pulse Oximetry Analysis -97 % on BiPAP    Cardiac Monitor:  Rate: 78 bpm  Rhythm: Normal sinus    EKG interpretation: (Preliminary)  2:09 PM     EKG read by Dr. Kassandra Domingo at 2:00 PM    Records Reviewed: Nursing Notes and Old Medical Records    Provider Notes (Medical Decision Making): DDX: COPD exacerbation, CHF, pneumonia, acute respiratory failure, hypoxia, metabolic    Patient's O2 sat was 63% while on the CPAP when the EMS arrived to the scene. Placed her emergently on EMS CPAP and they gave her multiple nebs and she improved to O2 of 95%    Emergently placed patient on BiPAP on arrival to ED. Patient in respiratory distress, using accessory muscles. Tight lung sounds    Emergent magnesium, BiPAP, Solu-Medrol and DuoNeb, magnesium. Will get labs and ABG, EKG chest x-ray    Procedures:  Procedures    ED Course:   Initial assessment performed. The patients presenting problems have been discussed, and they are in agreement with the care plan formulated and outlined with them. I have encouraged them to ask questions as they arise throughout their visit. ABG: pH 7.4, PCO2 81, PO2 59, HCO3 50    Reassessed patient and she is doing better on BiPAP, awake, states feels better, tries to talk through the mask. Consult:  Discussed care with Dr. Soren Alberto, Specialty: Hospitalist, standard discussion; including history of patients chief complaint, available diagnostic results, and treatment course. She accepts admission request intensivist to be consulted. Admit to ICU. We discussed question of possible lung mass on her chest x-ray versus a possible infectious process. She states that she will add antibiotic, we will get official read for chest x-ray. 2:53 PM, 9/12/2019     Consult:  Discussed care with Dr. Smita Pittman, Specialty: Intensivist, standard discussion; including history of patients chief complaint, available diagnostic results, and treatment course. She accepts patient to ICU  3:15 PM, 9/12/2019     I discussed diagnosis, results, plan with patient who agrees with plan.     Critical care time:   I have spent 52 minutes of critical care time involved in lab review, consultations with specialist, family decision making, and documentation. During this entire length of time I was immediately available to the patient. Critical care: The reason for providing this level of medical care for this critically ill patient was due to a critical illness that impaired one or more vital organ systems such that there was a high probability of imminent or life threatening deterioration in the patients condition. This care involved high complexity decision making to assess, manipulate and support vital system functions, to treat this degree vital organ system failure and to prevent further life threatening deterioration of the patient's condition. Diagnosis and Disposition           CLINICAL IMPRESSION:    1. Acute respiratory failure with hypoxia and hypercapnia (HCC)    2. BiPAP (biphasic positive airway pressure) dependence        PLAN:  1. Admission to ICU    Adelia Dailey DO    Dragon medical dictation software was used for portions of this report. Unintended transcription errors may occur. My signature above authenticates this document and my orders, the final    diagnosis (es), discharge prescription (s), and instructions in the Epic    record.

## 2019-09-12 NOTE — PROGRESS NOTES
Pharmacy Dosing Services: Vancomycin    Consult for Vancomycin Dosing by Pharmacy by Dr. Judson Savage provided for this 78y.o. year old female , for indication of HCAP. Day of Therapy 01    Ht Readings from Last 1 Encounters:   09/12/19 149.9 cm (59\")        Wt Readings from Last 1 Encounters:   09/12/19 48.1 kg (106 lb)        Previous Regimen NA   Last Level NA   Other Current Antibiotics Zosyn 3.375 gm IV q6   Significant Cultures Pending   Serum Creatinine Lab Results   Component Value Date/Time    Creatinine 0.88 09/12/2019 02:10 PM        Creatinine Clearance Estimated Creatinine Clearance: 38.1 mL/min (based on SCr of 0.88 mg/dL). BUN Lab Results   Component Value Date/Time    BUN 20 (H) 09/12/2019 02:10 PM        WBC Lab Results   Component Value Date/Time    WBC 3.4 (L) 09/12/2019 02:10 PM        H/H Lab Results   Component Value Date/Time    HGB 13.3 09/12/2019 02:10 PM        Platelets Lab Results   Component Value Date/Time    PLATELET 83 (L) 27/43/4184 02:10 PM        Temp 97.1 °F (36.2 °C)     Start Vancomycin therapy, with loading dose of 1250 mg IV x1 @16:00 09/12/19  Follow with maintenance dose of 750 mg IV q24, beginning @ 17:00 09/13/19    Dose calculated to approximate a therapeutic trough of 15-20 mcg/mL. Pharmacy to follow daily and will make changes to dose and/or frequency based on clinical status. Andrés Murray, Pharm. D.   Clinical Pharmacist  [phone]

## 2019-09-12 NOTE — PROGRESS NOTES
Bedside shift change report given to Britta Gill (oncoming nurse) by Mya Thakkar (offgoing nurse). Report included the following information SBAR, Kardex, MAR, Accordion, Cardiac Rhythm NSR. Patient is resting in bed, with call bell in reach. Denies any pain or distress, continues on BIpap.

## 2019-09-12 NOTE — PROGRESS NOTES
TRANSFER - IN REPORT:    Verbal report received from Karen Wilson on [facility]  being received from Ed(unit) for routine progression of care      Report consisted of patients Situation, Background, Assessment and   Recommendations(SBAR). Information from the following report(s) SBAR, Kardex, ED Summary, MAR, Recent Results, Med Rec Status, Cardiac Rhythm NSR and Quality Measures was reviewed with the receiving nurse. Opportunity for questions and clarification was provided. Assessment completed upon patients arrival to unit and care assumed. Patient dual assessment with Anne Weber and Ceferino Perez Patient noted with multiple wounds, Sacral has stage II on right and left, right lower leg calf wound with BLE noted weeping +5 edematous, stage II to back inbetween scapula, Mepilex Don,Heels Boggy Bilaterally, Mepilex  DON and heeels floated on pillows. Respiratory at bedside    Patient on Bipap sats 95%. Denies any pain or distress at current, Walters placed x2 Nurse, Padmini Perking and [address] urine output. Patient CHG completed,Bilateral lower venous scans being done at this time.

## 2019-09-13 NOTE — CONSULTS
[facility]  Pulmonary, Critical Care, and Sleep Medicine    Name: Perla Bro MRN: [id number]   : 1940 Hospital: [facility] [place]    Date: 2019  Room: 101/01     [facility] Note                                              Consult requesting physician: Dr. Leni Mcdaniels  Reason for Consult: respiratory failure, pneumonia, ? Lung mass, CHF    Subjective/History of Present Illness:     Patient is a 78 y.o. female with PMHx significant for COPD recently  placed on BIPAP and oxygen ( 5L and BIPAP at [place] [facility] to rehab) CHF ( diastolic dysfunction / ) recent pneumonia/dysphagia/chornicn edema of lower extremities ( 3 years)  She stopped smoking about 3 weeks ago, heavy smoker for over 50 years, no pulmonary follow up until last hospitalization. At rehab patient was taking her bronchodilators but was refusing NIV. At rehab she was noticed to have saturation 63%   Today sob/cough no choking episodes. No chest pain. Previous PVL was negative. 2019:  Overnight was very agitated but after  Haldol was finally able to tolerate BIPAP  Today was off BIPAP for short period but sob back on BIPAP  ABg close to baseline  Now contraction alkalosis so add 1 dose diamox hold lasix for 24 hrs   Very poor  prognosis  Today consulted palliative to clarify code status ( reports from [facility]  indicate was DNR/DNI ?  )  We will confirm with patient and family  I asked patient today but she was too confused to answer  panculure  CTa today evaluated  for both PE and mass pneumonia  Chronic  thrombocytopenia of unclear etiology  We have checked the records at [place] and was low <100   Monitor  Echo  Diastolic CHF   Trop   ecg   Lactic acid normla     On lovenox full dose if no pe change to SQ heparin     2019  BIPAP  abg follow up in 2-3 hrs   bronchodilators   Steroids abx  Gentle diuresis mixed pattern might need diamox tomorrow  if bicarbonate worse  Echo  Trop  pvl  CTA when more stable r/o lung mass  I had discussion with patient  and       I/O last 24 hrs: Intake/Output Summary (Last 24 hours) at 9/13/2019 1058  Last data filed at 9/13/2019 0354  Gross per 24 hour   Intake 500 ml   Output 3075 ml   Net -2575 ml         The patient is critically ill and can not provide additional history due to  Respiratory failure     History taken from  Family and partially from pateitn     Review of Systems:  A comprehensive review of systems was negative except for that written in the HPI. Review of Systems:   HEENT: No epistaxis, no nasal drainage, no difficulty in swallowing, no redness in eyes  Respiratory: as above  Cardiovascular: no chest pain, no palpitations, no chronic leg edema, no syncope  Gastrointestinal: no abd pain, no vomiting, no diarrhea, no bleeding symptoms  Genitourinary: No urinary symptoms or hematuria  Musculoskeletal:Neg  Neurological: No focal weakness, no seizures, no headaches  Behvioral/Psych: No anxiety, no depression  Constitutional: No fever, no chills, no weight loss, no night sweats     Allergies   Allergen Reactions    Iodine Hives      Past Medical History:   Diagnosis Date    Chronic kidney disease (CKD)     COPD (chronic obstructive pulmonary disease) (HCC)     Erythromelalgia (HCC)     Gait abnormality     Hypertension     Hypoxia     Muscle weakness (generalized)     Nicotine dependence     Osteoporosis     Respiratory failure (HCC)     Vitamin D deficiency       History reviewed. No pertinent surgical history. Social History     Tobacco Use    Smoking status: Former Smoker    Smokeless tobacco: Never Used   Substance Use Topics    Alcohol use: Not on file      History reviewed. No pertinent family history. Prior to Admission medications    Medication Sig Start Date End Date Taking? Authorizing Provider   fluticasone furoate-vilanterol (BREO ELLIPTA) 100-25 mcg/dose inhaler Take 1 Puff by inhalation daily.    Yes Other, MD Pepe nicotine (NICODERM CQ) 14 mg/24 hr patch 1 Patch by TransDERmal route every twenty-four (24) hours. Yes Randy, MD Pepe   predniSONE (DELTASONE) 20 mg tablet Take 20 mg by mouth Every Friday. Yes Randy, MD Pepe   furosemide (LASIX) 20 mg tablet Take 20 mg by mouth daily. Yes Randy, MD Pepe   atenolol (TENORMIN) 50 mg tablet Take  by mouth daily. Yes Randy, MD ePpe   atenolol (TENORMIN) 25 mg tablet Take 25 mg by mouth every evening. Yes Randy, MD Pepe   lisinopril-hydroCHLOROthiazide (PRINZIDE, ZESTORETIC) 20-12.5 mg per tablet Take 1 Tab by mouth daily. Yes Randy, MD ePpe   tiotropium (SPIRIVA WITH HANDIHALER) 18 mcg inhalation capsule Take 1 Cap by inhalation daily. Yes Randy, MD Pepe   raNITIdine (ZANTAC) 150 mg tablet Take 150 mg by mouth daily. Yes Randy, MD Pepe   bumetanide (BUMEX) 2 mg tablet Take 2 mg by mouth two (2) times a day.    Yes Randy, MD Pepe     Current Facility-Administered Medications   Medication Dose Route Frequency    acetaZOLAMIDE (DIAMOX) 250 mg in sterile water (preservative free) 2.5 mL injection  250 mg IntraVENous ONCE    piperacillin-tazobactam (ZOSYN) 3.375 g in 0.9% sodium chloride (MBP/ADV) 100 mL MBP  3.375 g IntraVENous Q6H    vancomycin (VANCOCIN) 750 mg in 0.9% sodium chloride 250 mL (VIAL-MATE)  750 mg IntraVENous Q24H    Vancomycin-Rx to Dose & Monitor  1 Each Other Rx Dosing/Monitoring    albuterol-ipratropium (DUO-NEB) 2.5 MG-0.5 MG/3 ML  3 mL Nebulization QID RT    budesonide (PULMICORT) 500 mcg/2 ml nebulizer suspension  500 mcg Nebulization BID RT    doxycycline (VIBRAMYCIN) 100 mg in 0.9% sodium chloride (MBP/ADV) 100 mL MBP  100 mg IntraVENous Q12H    insulin lispro (HUMALOG) injection   SubCUTAneous Q6H    famotidine (PF) (PEPCID) 20 mg in sodium chloride 0.9% 10 mL injection  20 mg IntraVENous Q24H    nicotine (NICODERM CQ) 14 mg/24 hr patch 1 Patch  1 Patch TransDERmal Q24H    enoxaparin (LOVENOX) injection 50 mg  1 mg/kg SubCUTAneous Q12H    hydrocortisone sod succ (PF) (SOLU-CORTEF) injection 200 mg  200 mg IntraVENous ONCE         Objective:   Vital Signs:    Visit Vitals  /73   Pulse 94   Temp 97.9 °F (36.6 °C)   Resp 27   Ht 4' 11\" (1.499 m)   Wt 48.1 kg (106 lb)   SpO2 96%   BMI 21.41 kg/m²       O2 Device: BIPAP   O2 Flow Rate (L/min): 3 l/min   Temp (24hrs), Av.4 °F (36.3 °C), Min:97.1 °F (36.2 °C), Max:97.9 °F (36.6 °C)       Intake/Output:   Last shift:      No intake/output data recorded. Last 3 shifts:  1901 -  0700  In: 500 [I.V.:500]  Out: 3075 [Urine:3075]      Intake/Output Summary (Last 24 hours) at 2019 1058  Last data filed at 2019 0354  Gross per 24 hour   Intake 500 ml   Output 3075 ml   Net -2575 ml       Last 3 Recorded Weights in this Encounter    19 1407 19 1017   Weight: 48.1 kg (106 lb) 48.1 kg (106 lb)       BIPAP 16/7 fio2 35 %    Recent Labs     19  0631 19  2050 19  1452   PHI 7.461* 7.394 7.402   PCO2I 71.5* 76.7* 81.2*   PO2I 72* 88 59*   HCO3I 51.1* 47.3* 50.6*   FIO2I 0.35 0.35 28       Physical Exam:     General/Neurology: Alert, Awake in moderate respiratory distress    Head:   Normocephalic, without obvious abnormality, atraumatic. Eye:   EOM intact  no scleral icterus, no pallor, no cyanosis. Nose:   No sinus tenderness  Throat:  Lips, mucosa, and tongue normal. No oral thrush. Neck:   Supple, symmetric. No lymphadenopathy. Trachea midline  Lung:   Bilateral crckles mild rhonchi   Heart:   Regular rate & rhythm. S1 S2 present. No murmur. No JVD. Abdomen:  Soft. NT. ND. +BS. No masses. Extremities:  Bilateral 3 plus edema . No cyanosis. No clubbing. Pulses: weak present   Lymphatic:  No cervical or supraclavicular palpable lymphadenopathy. Musculoskeletal: No joint swelling. No tenderness.    Skin:    red tender lwoer extremites       Data:       Recent Results (from the past 24 hour(s))   EKG, 12 LEAD, INITIAL Collection Time: 09/12/19  2:00 PM   Result Value Ref Range    Ventricular Rate 76 BPM    Atrial Rate 76 BPM    P-R Interval 142 ms    QRS Duration 68 ms    Q-T Interval 360 ms    QTC Calculation (Bezet) 405 ms    Calculated P Axis 86 degrees    Calculated R Axis 87 degrees    Calculated T Axis 56 degrees    Diagnosis       Poor data quality, interpretation may be adversely affected  Normal sinus rhythm  Normal ECG  No previous ECGs available  Confirmed by Javi Corey MD. ([id number]) on 9/12/2019 10:32:27 PM     CBC WITH AUTOMATED DIFF    Collection Time: 09/12/19  2:10 PM   Result Value Ref Range    WBC 3.4 (L) 4.6 - 13.2 K/uL    RBC 4.59 4.20 - 5.30 M/uL    HGB 13.3 12.0 - 16.0 g/dL    HCT 45.6 (H) 35.0 - 45.0 %    MCV 99.3 (H) 74.0 - 97.0 FL    MCH 29.0 24.0 - 34.0 PG    MCHC 29.2 (L) 31.0 - 37.0 g/dL    RDW 14.2 11.6 - 14.5 %    PLATELET 83 (L) 802 - 420 K/uL    MPV 11.2 9.2 - 11.8 FL    NEUTROPHILS 71 40 - 73 %    LYMPHOCYTES 22 21 - 52 %    MONOCYTES 6 3 - 10 %    EOSINOPHILS 1 0 - 5 %    BASOPHILS 0 0 - 2 %    ABS. NEUTROPHILS 2.5 1.8 - 8.0 K/UL    ABS. LYMPHOCYTES 0.7 (L) 0.9 - 3.6 K/UL    ABS. MONOCYTES 0.2 0.05 - 1.2 K/UL    ABS. EOSINOPHILS 0.0 0.0 - 0.4 K/UL    ABS.  BASOPHILS 0.0 0.0 - 0.1 K/UL    PLATELET COMMENTS Platelet Estimate, Decreased      RBC COMMENTS POIKILOCYTOSIS  SLIGHT        DF AUTOMATED     METABOLIC PANEL, COMPREHENSIVE    Collection Time: 09/12/19  2:10 PM   Result Value Ref Range    Sodium 140 136 - 145 mmol/L    Potassium 3.9 3.5 - 5.5 mmol/L    Chloride 89 (L) 100 - 111 mmol/L    CO2 >45 (HH) 21 - 32 mmol/L    Anion gap Cannot be calculated 3.0 - 18 mmol/L    Glucose 95 74 - 99 mg/dL    BUN 20 (H) 7.0 - 18 MG/DL    Creatinine 0.88 0.6 - 1.3 MG/DL    BUN/Creatinine ratio 23 (H) 12 - 20      GFR est AA >60 >60 ml/min/1.73m2    GFR est non-AA >60 >60 ml/min/1.73m2    Calcium 8.8 8.5 - 10.1 MG/DL    Bilirubin, total 0.8 0.2 - 1.0 MG/DL    ALT (SGPT) 35 13 - 56 U/L    AST (SGOT) 21 10 - 38 U/L    Alk. phosphatase 108 45 - 117 U/L    Protein, total 6.2 (L) 6.4 - 8.2 g/dL    Albumin 3.6 3.4 - 5.0 g/dL    Globulin 2.6 2.0 - 4.0 g/dL    A-G Ratio 1.4 0.8 - 1.7     CARDIAC PANEL,(CK, CKMB & TROPONIN)    Collection Time: 09/12/19  2:10 PM   Result Value Ref Range    CK 30 26 - 192 U/L    CK - MB 3.0 <3.6 ng/ml    CK-MB Index 10.0 (H) 0.0 - 4.0 %    Troponin-I, QT <0.02 0.0 - 0.045 NG/ML   NT-PRO BNP    Collection Time: 09/12/19  2:10 PM   Result Value Ref Range    NT pro-BNP 5,425 (H) 0 - 1,800 PG/ML   HEMOGLOBIN A1C WITH EAG    Collection Time: 09/12/19  2:10 PM   Result Value Ref Range    Hemoglobin A1c 6.6 (H) 4.2 - 5.6 %    Est. average glucose 143 mg/dL   POC LACTIC ACID    Collection Time: 09/12/19  2:19 PM   Result Value Ref Range    Lactic Acid (POC) 1.49 0.40 - 2.00 mmol/L   POC VENOUS BLOOD GAS    Collection Time: 09/12/19  2:40 PM   Result Value Ref Range    Device: BIPAP      FIO2 (POC) 28 %    pH, venous (POC) 7.391 7.32 - 7.42      pCO2, venous (POC) 84.1 (H) 41 - 51 MMHG    pO2, venous (POC) 38 25 - 40 mmHg    HCO3, venous (POC) 51.0 (H) 23.0 - 28.0 MMOL/L    sO2, venous (POC) 67 65 - 88 %    Base excess, venous (POC) 26 mmol/L    PEEP/CPAP (POC) 6 cmH2O    PIP (POC) 14      Allens test (POC) YES      Total resp. rate 20      Site RIGHT RADIAL      Specimen type (POC) VENOUS BLOOD      Performed by Vlad Powers     Spontaneous timed YES     POC G3    Collection Time: 09/12/19  2:52 PM   Result Value Ref Range    Device: BIPAP      FIO2 (POC) 28 %    pH (POC) 7.402 7.35 - 7.45      pCO2 (POC) 81.2 (H) 35.0 - 45.0 MMHG    pO2 (POC) 59 (L) 80 - 100 MMHG    HCO3 (POC) 50.6 (H) 22 - 26 MMOL/L    sO2 (POC) 88 (L) 92 - 97 %    Base excess (POC) 26 mmol/L    PEEP/CPAP (POC) 6 cmH2O    PIP (POC) 14      Allens test (POC) YES      Total resp.  rate 30      Site LEFT RADIAL      Specimen type (POC) ARTERIAL      Performed by Vlad Powers     Spontaneous timed YES     GLUCOSE, POC    Collection Time: 09/12/19  5:45 PM   Result Value Ref Range    Glucose (POC) 112 (H) 70 - 110 mg/dL   POC G3    Collection Time: 09/12/19  8:50 PM   Result Value Ref Range    Device: BIPAP      FIO2 (POC) 0.35 %    pH (POC) 7.394 7.35 - 7.45      pCO2 (POC) 76.7 (H) 35.0 - 45.0 MMHG    pO2 (POC) 88 80 - 100 MMHG    HCO3 (POC) 47.3 (H) 22 - 26 MMOL/L    sO2 (POC) 96 92 - 97 %    Base excess (POC) 22 mmol/L    PEEP/CPAP (POC) 6 cmH2O    PIP (POC) 16      Pressure support 10 cmH2O    Allens test (POC) N/A      Total resp. rate 20      Site RIGHT RADIAL      Patient temp. 97.2      Specimen type (POC) ARTERIAL      Performed by Rochelle Knutson timed YES     GLUCOSE, POC    Collection Time: 09/12/19 11:23 PM   Result Value Ref Range    Glucose (POC) 183 (H) 70 - 339 mg/dL   METABOLIC PANEL, BASIC    Collection Time: 09/13/19  4:30 AM   Result Value Ref Range    Sodium 140 136 - 145 mmol/L    Potassium 3.9 3.5 - 5.5 mmol/L    Chloride 87 (L) 100 - 111 mmol/L    CO2 >45 (HH) 21 - 32 mmol/L    Anion gap Cannot be calculated 3.0 - 18 mmol/L    Glucose 154 (H) 74 - 99 mg/dL    BUN 22 (H) 7.0 - 18 MG/DL    Creatinine 1.09 0.6 - 1.3 MG/DL    BUN/Creatinine ratio 20 12 - 20      GFR est AA 58 (L) >60 ml/min/1.73m2    GFR est non-AA 48 (L) >60 ml/min/1.73m2    Calcium 8.3 (L) 8.5 - 10.1 MG/DL   CBC WITH AUTOMATED DIFF    Collection Time: 09/13/19  4:30 AM   Result Value Ref Range    WBC 2.7 (L) 4.6 - 13.2 K/uL    RBC 3.89 (L) 4.20 - 5.30 M/uL    HGB 11.3 (L) 12.0 - 16.0 g/dL    HCT 37.4 35.0 - 45.0 %    MCV 96.1 74.0 - 97.0 FL    MCH 29.0 24.0 - 34.0 PG    MCHC 30.2 (L) 31.0 - 37.0 g/dL    RDW 14.0 11.6 - 14.5 %    PLATELET 82 (L) 984 - 420 K/uL    MPV 11.2 9.2 - 11.8 FL    NEUTROPHILS 83 (H) 40 - 73 %    LYMPHOCYTES 15 (L) 21 - 52 %    MONOCYTES 2 (L) 3 - 10 %    EOSINOPHILS 0 0 - 5 %    BASOPHILS 0 0 - 2 %    ABS. NEUTROPHILS 2.2 1.8 - 8.0 K/UL    ABS. LYMPHOCYTES 0.4 (L) 0.9 - 3.6 K/UL    ABS.  MONOCYTES 0.1 0.05 - 1.2 K/UL    ABS. EOSINOPHILS 0.0 0.0 - 0.4 K/UL    ABS. BASOPHILS 0.0 0.0 - 0.1 K/UL    DF AUTOMATED     MAGNESIUM    Collection Time: 09/13/19  4:30 AM   Result Value Ref Range    Magnesium 2.1 1.6 - 2.6 mg/dL   GLUCOSE, POC    Collection Time: 09/13/19  5:43 AM   Result Value Ref Range    Glucose (POC) 135 (H) 70 - 110 mg/dL   POC G3    Collection Time: 09/13/19  6:31 AM   Result Value Ref Range    Device: BIPAP      FIO2 (POC) 0.35 %    pH (POC) 7.461 (H) 7.35 - 7.45      pCO2 (POC) 71.5 (H) 35.0 - 45.0 MMHG    pO2 (POC) 72 (L) 80 - 100 MMHG    HCO3 (POC) 51.1 (H) 22 - 26 MMOL/L    sO2 (POC) 94 92 - 97 %    Base excess (POC) 27 mmol/L    PEEP/CPAP (POC) 6 cmH2O    PIP (POC) 16      Pressure support 10 cmH2O    Allens test (POC) N/A      Total resp.  rate 15      Site RIGHT RADIAL      Patient temp. 98.3      Specimen type (POC) ARTERIAL      Performed by Estuardo Phillip     Spontaneous timed YES     ECHO ADULT FOLLOW-UP OR LIMITED    Collection Time: 09/13/19 10:18 AM   Result Value Ref Range    Ao Root D 3.20 cm    LVIDd 4.12 3.9 - 5.3 cm    LVPWd 0.86 0.6 - 0.9 cm    LVIDs 2.57 cm    IVSd 0.89 0.6 - 0.9 cm    LV ED Vol A2C 32.4 mL    LV ES Vol A4C 10.4 mL    LV ES Vol BP 12.0 (A) 19 - 49 mL    LVOT d 1.88 cm    LV E' Septal Velocity 4.00 cm/s    LV E' Lateral Velocity 6.00 cm/s    MVA (PHT) 7.1 cm2    MV A Ross 190.51 cm/s    MV E Ross 115.60 cm/s    MV E/A 0.60     BP EF 64.8 55 - 100 %    LV Ejection Fraction MOD 4C 68 %    LV Ejection Fraction MOD 2C 60 %    LV Mass .2 67 - 162 g    LV Mass AL Index 77.8 43 - 95 g/m2    E/E' lateral 19.27     E/E' septal 28.90     E/E' ratio (averaged) 24.08     LV ES Vol A2C 13.1 mL    LVES Vol Index BP 8.5 mL/m2    LV ED Vol A4C 32.3 mL    LVED Vol Index BP 24.1 mL/m2    Mitral Valve E Wave Deceleration Time 106.8 ms    Mitral Valve Pressure Half-time 31.0 ms    Triscuspid Valve Regurgitation Peak Gradient 31.3 mmHg    LV ED Vol BP 34.0 (A) 56 - 104 ml    TR Max Velocity 279.91 cm/s    LVED Vol Index A4C 22.9 mL/m2    LVED Vol Index A2C 23.0 mL/m2    LVES Vol Index A4C 7.4 mL/m2    LVES Vol Index A2C 9.3 mL/m2         Chemistry Recent Labs     09/13/19  0430 09/12/19  1410   * 95    140   K 3.9 3.9   CL 87* 89*   CO2 >45* >45*   BUN 22* 20*   CREA 1.09 0.88   CA 8.3* 8.8   MG 2.1  --    AGAP Cannot be calculated Cannot be calculated   BUCR 20 23*   AP  --  108   TP  --  6.2*   ALB  --  3.6   GLOB  --  2.6   AGRAT  --  1.4        Lactic Acid No results found for: LAC  No results for input(s): LAC in the last 72 hours. Liver Enzymes Protein, total   Date Value Ref Range Status   09/12/2019 6.2 (L) 6.4 - 8.2 g/dL Final     Albumin   Date Value Ref Range Status   09/12/2019 3.6 3.4 - 5.0 g/dL Final     Globulin   Date Value Ref Range Status   09/12/2019 2.6 2.0 - 4.0 g/dL Final     A-G Ratio   Date Value Ref Range Status   09/12/2019 1.4 0.8 - 1.7   Final     AST (SGOT)   Date Value Ref Range Status   09/12/2019 21 10 - 38 U/L Final     Alk. phosphatase   Date Value Ref Range Status   09/12/2019 108 45 - 117 U/L Final     Recent Labs     09/12/19  1410   TP 6.2*   ALB 3.6   GLOB 2.6   AGRAT 1.4   SGOT 21           CBC w/Diff Recent Labs     09/13/19  0430 09/12/19  1410   WBC 2.7* 3.4*   RBC 3.89* 4.59   HGB 11.3* 13.3   HCT 37.4 45.6*   PLT 82* 83*   GRANS 83* 71   LYMPH 15* 22   EOS 0 1        Cardiac Enzymes Lab Results   Component Value Date/Time    CPK 30 09/12/2019 02:10 PM    CKMB 3.0 09/12/2019 02:10 PM    CKND1 10.0 (H) 09/12/2019 02:10 PM    TROIQ <0.02 09/12/2019 02:10 PM        BNP No results found for: BNP, BNPP, XBNPT     Coagulation No results for input(s): PTP, INR, APTT in the last 72 hours.     No lab exists for component: INREXT, INREXT      Thyroid  No results found for: T4, T3U, TSH, TSHEXT, TSHEXT    No results found for: T4     Urinalysis No results found for: COLOR, APPRN, SPGRU, REFSG, TRISTON, PROTU, GLUCU, KETU, BILU, UROU, BRAXTON, LEUKU, GLUKE, EPSU, BACTU, WBCU, RBCU, CASTS, UCRY     Micro  No results for input(s): SDES, CULT in the last 72 hours. No results for input(s): CULT in the last 72 hours. Culture data during this hospitalization. All Micro Results     Procedure Component Value Units Date/Time    CULTURE, URINE [[id number]]     Order Status:  Sent Specimen:  Cath Urine     CULTURE, BLOOD [[id number]]     Order Status:  Sent Specimen:  Whole Blood     CULTURE, BLOOD [[id number]]     Order Status:  Sent Specimen:  Whole Blood     STREP PNEUMO AG, URINE [[id number]]     Order Status:  Sent Specimen:  Urine     LEGIONELLA PNEUMOPHILA AG, URINE [[id number]]     Order Status:  Sent Specimen:  Urine                PFT       Ultrasound       LE Doppler       ECHO       Images report reviewed by me:  CT (Most Recent) (CT chest reviewed by me) No results found for this or any previous visit. CXR reviewed by me:  XR (Most Recent). CXR  reviewed by me and compared with previous CXR Results from Hospital Encounter encounter on 09/12/19   XR CHEST PORT    Narrative EXAM: CHEST RADIOGRAPH, SINGLE VIEW    CLINICAL INDICATION/HISTORY: Shortness of breath    COMPARISON: None. TECHNIQUE: Portable frontal view of the chest was obtained.     _______________    FINDINGS:    SUPPORT DEVICES: None. HEART AND MEDIASTINUM: Cardiomediastinal silhouette appears within normal  limits. Normal caliber thoracic aorta. No central vascular congestion. Asymmetric right hilar convexity    LUNGS AND PLEURAL SPACES: Small, right greater than left pleural effusions, each  with adjacent basilar atelectasis. Asymmetric, right greater than left apical  capping is also present. Rounded opacity is seen within the inferior aspect of  the right lower lobe. There is also asymmetric pleural-based wedge-shaped  opacity within the lateral aspect of the right upper lobe. BONY THORAX AND SOFT TISSUES: No acute osseous abnormality.  Moderate  degenerative changes of right and left shoulders with left-sided rotator cuff  arthropathy. _______________      Impression IMPRESSION:    Small, right greater than left pleural effusions with masslike opacity within  inferior right lower lobe and lateral right upper lobe. Asymmetric right hilar  convexity, concerning for lymphadenopathy. Recommend chest CT to evaluate  cluster of findings which are concerning for malignancy. IMPRESSION:   · Acute on chronic  hypercarbic and hypoxemic respiratory failure- COPd exacerbation. I have reviewed data and she had been hospitalized . Non complaint with NIV on [facility]L [facility] . Resume NIV . Copd tx   · Acute on chronic  CHF- ? Diastolic dysfunction - not clear worse now patient was on diuretics and had normal EF. Low albumins , chronic  edema. Gentle diuresis if bicarbonate worse might need Diamox. echo trop ecg   · Pneumonia ? HAP recently NH/hospital or aspiration van zosyn doxy . Urine strep leg  · Possible lung mass once more stable CTa for both Pe and lung mass   · Hypoxemia- multifactorial COPD/CHF diastolic/ pneumonia vs lung mass. Less likley PE but have to exclude   Patient Active Problem List   Diagnosis Code    BiPAP (biphasic positive airway pressure) dependence Z99.89    Acute on chronic respiratory failure with hypoxia and hypercapnia (HCC) J96.21, J96.22    COPD exacerbation (HCC) J44.1    Hypertension I10    Severe protein-calorie malnutrition (HCC) E43    Thrombocytopenia (HCC) D69.6    Erythromelalgia ([facility] [address] I73.81           · Code status: I had a discussion about code status full code       RECOMMENDATIONS:   Respiratory:  BIPAP 16/7 fio2 30   bronchodilators  abg   Gentle dieresis  abx   Once  more stable CTA. Ok to give full dose lovenox in a meantime   Now PVL less likely PE more aspiration pneumonia vs mass   Protecting airway but might require intubation   Keep SPO2 >=92%. HOB 30 degree elevation all the time. Aggressive pulmonary toileting.  Aspiration precautions. Incentive spirometry. CVS: ? diastolic CHf ? BNP high r/o PE as well less  likely . Had normal echo 1 month ago. Trop ecg   ID: pneumonia aspiration vs HAP  Van zosyn doxy   Sepsis bundle and protocol followed. Follow serial lactic acid, frequent BMP check, fluid resuscitation. Follow cultures. Deescalate antibiotic when appropriate. Hematology/Oncology: follow wbc   Renal: daily high bicabonate if higher hold lasix and give diamox   GI/: ppi   Endocrine: Monitor BS. SSI. NPain/Sedation: prn   Skin/Wound: legs swollen tender erythema   Electrolytes: Replace electrolytes per ICU electrolyte replacement protocol. IVF: kvo  Nutrition: hold today   Prophylaxis: DVT Prophylaxis:  Full dose lovenox  GI Prophylaxis:   patel  Advance Directive/Palliative Care: consulted    Will defer respective systems problem management to primary and other respective consultant and follow patient in ICU with primary and other medical team.  Further recommendations will be based on the patient's response to recommended treatment and results of the investigation ordered. Quality Care: PPI, DVT prophylaxis, HOB elevated, Infection control all reviewed and addressed. Care of plan d/w RN, RT, MDR.  D/w patient and family     Moderate complexity decision making was performed during the evaluation of this patient at high risk for decompensation with multiple organ involvement. Total critical care time spent rendering care exclusive of procedures: 60  minutes.          Yancy Sam MD  9/13/2019

## 2019-09-13 NOTE — FAMILY MEETING
We have consulted palliative care and had extensive discussion regarding patient code status  Prognosis  tx   patient had palliative care consultation  at [facility] end of August  All notes are indicating DNR order  patient is confused on max BIPAP. Daughter came to discuss  patient of max BIPAP  We got a cope of the durable power of  signed by patient end of August  That confirmed DNR order  We have clarified DNR/DNI   continue current care  If not better the daughter is considering hospice  patient is confused but awake . Oxygenation stable  continue BIPAP all night  Daytime prn  She can have short breaks  On NC  A.  Saskhi Forman MD

## 2019-09-13 NOTE — PROGRESS NOTES
Reason for Admission:   C/o SOB                  RRAT Score:   18               Do you (patient/family) have any concerns for transition/discharge? Not at this time                  Plan for utilizing home health:   TBD    Current Advanced Directive/Advance Care Plan:  Not on chart          Transition of Care Plan:     Chart reviewed noted from ED notes pt arrived from The [address] d/t respiratory distress,Hx includes COPD,HTN cm will attend IDR's and initiate d/c planning. Care Management Interventions  PCP Verified by CM: Yes  Palliative Care Criteria Met (RRAT>21 & CHF Dx)?: No  Mode of Transport at Discharge: S  Current Support Network: [address]  Confirm Follow Up Transport: Other (see comment)  Discharge Location  Discharge Placement: [facility]- cm met with daughter Aleisha Chung along with Jose De Jesus [place], daughter informed cm that she will likely lean towards hospice services pending further test results and more time to think things over, daughter also informed cm that she believes her mother did apply for medicaid cm notified ANTONY Goddard via Email to verify medicaid application, last UAI was completed on 8- via [facility] cm requested copy via fax from [place] [phone], cm also notified Tamera Barrios from [facility] of possible hospice services, at this time disposition/ care decisions not finalized and pt will remain over weekend, weekend cm will be available for immediate needs. Care Management Interventions  PCP Verified by CM:  Yes  Palliative Care Criteria Met (RRAT>21 & CHF Dx)?: No  Mode of Transport at Discharge: S  Current Support Network: [address]  Confirm Follow Up Transport: Other (see comment)  Discharge Location  Discharge Placement: Skilled nursing facility

## 2019-09-13 NOTE — PROGRESS NOTES
0930 hrs Sbar report received from [place] [id number]. Assumed care . Pt  drowsy in bed. On o2 at 3 l NC spo2 at 96%. SR on the monitor. patel cath patent, Abx infusing via pump.sitter at bedside. Assessment completed. See flow sheet. 1000 hrs Pt place on Bipap per Dr Celeste Condon. Rt at bedside. 1200 hrs Reassessed pt. Sitter at bedside     1240 hrs transferred pt to [address] [place] [address] . Rt at bedside. 1310 hrs Returned pt back the room. Remains on Bipap. NAD     1315 hrs Palliative care team and Dr Celeste Condon discussing treatment with Family. 1415 hrs Code status has been to DNR/DNI    1500 hrs Pt agitated, pulling lines ,removing Bipap mask. Haldol given as ordered. sitter at bedside. 1600 hrs reassessed pt. 1810 hrs Pt agitated, trying to climbed out bed, pulling lines. Paged Hospitalist.new order in place. see flow sheet. Sitter, Rt at bedside. Bedside and Verbal shift change report given to [address] [place] [address] (oncoming nurse) by Brarett Gonzáles (offgoing nurse). Report included the following information SBAR, Kardex, ED Summary, Cardiac Rhythm NSR and Alarm Parameters .

## 2019-09-13 NOTE — PROGRESS NOTES
Speech Therapy Note:    SLP orders received and attempted x2 however, patient:      []  Lethargic, unable to be alerted enough for safe PO intake  []  Refused participation  []  Off the unit  []  NPO for procedure  [x]  Other: on BiPAP     SLP will f/u next day or as medically indicated.  Thank you for this referral.     Nery Heredia M.S., [address] [facility]  Speech Language Pathologist

## 2019-09-13 NOTE — PROGRESS NOTES
Hospitalist Progress Note-critical care note     Patient: Mckay Ray MRN: [id number]  [facility]: [id number]    YOB: 1940  Age: 78 y.o. Sex: female    DOA: 9/12/2019 LOS:  LOS: 1 day            Chief complaint: acute on chronic respiratory failure, copd exacerbation , htn , severe protein malnutrition     Assessment/Plan         Hospital Problems  Never Reviewed          Codes Class Noted POA    BiPAP (biphasic positive airway pressure) dependence ICD-10-CM: Z99.89  ICD-9-CM: V46.8  9/12/2019 Unknown        Acute on chronic respiratory failure with hypoxia and hypercapnia (HCC) ICD-10-CM: J96.21, J96.22  ICD-9-CM: 518.84, 786.09, 799.02  9/12/2019         COPD exacerbation ([email or web address] 75.) ICD-10-CM: J44.1  ICD-9-CM: 491.21  9/12/2019 Unknown        Hypertension ICD-10-CM: I10  ICD-9-CM: 401.9  9/12/2019 Unknown        Severe protein-calorie malnutrition ([facility] [[email or web address] 75.) ICD-10-CM: E43  ICD-9-CM: 262  9/12/2019 Unknown        Thrombocytopenia ([facility] [[email or web address] 75.) ICD-10-CM: D69.6  ICD-9-CM: 287.5  9/12/2019 Unknown        Erythromelalgia ([facility] [[email or web address] 75.) ICD-10-CM: O09.46  ICD-9-CM: 443.82  9/12/2019 Unknown                Acute on chronic respiratory failure with hypoxia and hypercapnia   Off  bipap AM, continue nc O2  Will have cta  Today to r/o PE and work on mass like opacity and r/o PE   Cxr:Small, right greater than left pleural effusions with masslike opacity within  inferior right lower lobe and lateral right upper lobe   Effusion        Copd exacerbation  Iv steroid, breathing tx     Possible pna  Will continue iv abx  And breathing tx         Pleural effusion- so far no documentation for chf- will figure out after echo   Continue  lasix   Echo done AM, results still pending   pvl negative for PE         htn : continue home medication, hydralazine prn         Pancytopenia -continue monitoring, might related abx use.  If need will have hematologist on board-will hold it now   Thrombocytopenia : was 68 on 8/29/19 from [facility] record Stable at baseline     subjective: feel better now  rn : counts low   Will continue iv abx ,bipap at daytime as needed, speech evaluation   Aspiration/fall precaution     Disposition :tbd,   Review of systems:    General: No fevers or chills. Cardiovascular: No chest pain or pressure. No palpitations. Pulmonary:  shortness of breath better   Gastrointestinal: No nausea, vomiting. Vital signs/Intake and Output:  Visit Vitals  /73   Pulse 94   Temp 97.9 °F (36.6 °C)   Resp 27   Ht 4' 11\" (1.499 m)   Wt 48.1 kg (106 lb)   SpO2 96%   BMI 21.41 kg/m²     Current Shift:  No intake/output data recorded. Last three shifts:  09/11 1901 - 09/13 0700  In: 500 [I.V.:500]  Out: 3075 [Urine:3075]    Physical Exam:  General: WD, WN. Alert, cooperative, no acute distress    HEENT: NC, Atraumatic. PERRLA, anicteric sclerae. Lungs: + Wheezing/Rhonchi/Rales. Heart:  Regular  rhythm,  No murmur, No Rubs, No Gallops  Abdomen: Soft, Non distended, Non tender.  +Bowel sounds,   Extremities: No c/c, swelling better   Psych:   Not anxious or agitated. Neurologic:  No acute neurological deficit. Labs: Results:       Chemistry Recent Labs     09/13/19  0430 09/12/19  1410   * 95    140   K 3.9 3.9   CL 87* 89*   CO2 >45* >45*   BUN 22* 20*   CREA 1.09 0.88   CA 8.3* 8.8   AGAP Cannot be calculated Cannot be calculated   BUCR 20 23*   AP  --  108   TP  --  6.2*   ALB  --  3.6   GLOB  --  2.6   AGRAT  --  1.4      CBC w/Diff Recent Labs     09/13/19  0430 09/12/19  1410   WBC 2.7* 3.4*   RBC 3.89* 4.59   HGB 11.3* 13.3   HCT 37.4 45.6*   PLT 82* 83*   GRANS 83* 71   LYMPH 15* 22   EOS 0 1      Cardiac Enzymes Recent Labs     09/12/19  1410   CPK 30   CKND1 10.0*      Coagulation No results for input(s): PTP, INR, APTT in the last 72 hours.     No lab exists for component: INREXT    Lipid Panel No results found for: CHOL, CHOLPOCT, CHOLX, CHLST, CHOLV, 013778, HDL, HDLP, LDL, LDLC, DLDLP, 450211, VLDLC, VLDL, TGLX, TRIGL, TRIGP, TGLPOCT, CHHD, CHHDX   BNP No results for input(s): BNPP in the last 72 hours.    Liver Enzymes Recent Labs     09/12/19  1410   TP 6.2*   ALB 3.6      SGOT 21      Thyroid Studies No results found for: T4, T3U, TSH, TSHEXT     Procedures/imaging: see electronic medical records for all procedures/Xrays and details which were not copied into this note but were reviewed prior to creation of Lesly Bautista MD

## 2019-09-13 NOTE — ACP (ADVANCE CARE PLANNING)
Patient doesn't have an advanced directive. She's a  and has two adult children, daughter Gege Yeung and son Wallace Johnston. Son Wallace Johnston has deferred medical decision making to sister Gege Yeung. Gege Yeung will be the primary healthcare decision maker on patient's behalf, as patient is currently confused and wearing BiPAP. Gege Yeung provided financial POA document naming her, placed on paper chart. Patient signed own Durable DNR at [facility]. [facility] rehab and received faxed copy. Daughter has confirmed wishes for DNR/DNI on patient's behalf. Code status was changed to reflect wishes for DNR/DNI.      NOK: Loida Morin (daughter)  [phone]    Jimena Melton. (son)  [phone]

## 2019-09-13 NOTE — PROGRESS NOTES
PATIENT WITH LABORED RESPIRATIONS. ATTEMPTED TO RESUME BIPAP BUT PATIENT REFUSED/ PULLING AT MASK.   RETURNED TO 3L NC.

## 2019-09-13 NOTE — PROGRESS NOTES
[address] [phone] (COPE)    Patient Name: Berenice Aguirre  YOB: 1940    Reason for Consult: goals of care  Requesting Provider: Dr. Randy Negro   Primary Care Physician: Jessica Holloway MD     SUMMARY:   Berenice Aguirer is a 78 y.o. with a past history of COPD, HTN, who was admitted on 9/12/2019 from [address] [place] [address] with a diagnosis of Acute on chronic respiratory failure, COPD exacerbation. Current medical issues leading to Palliative Medicine involvement include: 79 yo with COPD exacerbation. Palliative medicine consult noted and appreciated. PM team members Licha BLOOM and this writer met with patient, her daughter, Kamaljit Light and granddaughter in law Manjula Luevano. Patient was alert but unable to participate in discussion due to confusion. Kamaljit Light shared patient's recent history of hospitalization and rehab. Stated Ildefonso Salas was doing so good one day and the next, she is back in the hospital\". Kamaljit Light states understanding that her mother has End Stage COPD. She stated she was trying to follow her mother's wishes. She did not think her mother wanted intubation. She stated she had spoken to  at rehab and was considering hospice and comfort care but then her mother's health improved significantly. \"Now, here we are again\". She is hopeful that her mother is able to come off the BIPAP again as this seems to be very uncomfortable. \"She hates it\". We did discuss comfort care if Mrs. Sarah Christianson does not improve. Kamaljit Light stated understanding but stated \"It would have to be in a facility. I have to work\". We discussed CPR and intubation. Kamaljit Light was unsure if her mother had ever signed DDNR. She did state that if her mother had ever signed DDNR, she would honor that wish. \"I just need to know what she had decided about code status\". DDNR signed by patient at [address] was obtained from [address] where her mother was in rehab.  Kamaljit Light agreed with change in code status to DNR/DNI. Dr. La Nena Bishop also discussed for verification. She does not want to consider comfort care at this time as she is hopeful her mother improves. If patient does not recover, Harish Orta would consider comfort measures with hospice. She stated Medicaid application is pending. Care manager Delma Evans will check on status. DDNR placed in chart. Copy provided to Harish Orta. Harish Orta was able to provide [employer] but it does not address medical. Patient is a  and has two children. Son, Chelsie Stephen. Has deferred to his sister Harish Orta to be decision maker. Harish Orta will be the primary healthcare decision maker on patient's behalf, as patient is currently confused and wearing BiPAP. Code status was changed to reflect patient's wishes. Contact information provided to Harish Orta. PM will follow for support and information. RECOMMENDATIONS:   1. Continue current care  2. DNR/DNI  3. Continue discussions as patient's condition as condition deems appropriate     GOALS OF CARE / TREATMENT PREFERENCES:       TREATMENT PREFERENCES:   Code Status: DNR    Advance Care Planning:    Advance Care Planning 9/12/2019   Patient's Healthcare Decision Maker is: Legal Next of Kin   Confirm Advance Directive None   Patient Would Like to Complete Advance Directive No         Patient doesn't have an advanced directive. She's a  and has two adult children, daughter Harish Orta and son Epi Benítez. Son Epi Benítez has deferred medical decision making to sister Harish Orta. Harish Orta will be the primary healthcare decision maker on patient's behalf, as patient is currently confused and wearing BiPAP. Rooseveltdelon Juniraúl provided financial POA document naming her, placed on paper chart.    Other Instructions:   NOK: Georgie Bee (daughter)  [phone]     Chelsie Stephen. (son)  [phone]                CLINICAL ASSESSMENT:   Palliative Performance Scale (PPS):       Modified ESAS Completed by: provider     Drowsiness: 3 Clinical Pain Assessment (nonverbal scale for severity on nonverbal patients):          1     PSYCHOSOCIAL/SPIRITUAL ASSESSMENT:   Palliative IDT has assessed this patient for cultural preferences / practices and a referral made as appropriate to needs (Cultural Services, Patient Advocacy, Ethics, etc.)    Any spiritual / Zoroastrian concerns:  [] Yes /  [x] No    Caregiver Burnout:  [] Yes /  [x] No /  [] No Caregiver Present      Anticipatory grief assessment:   [x] Normal  / [] Maladaptive

## 2019-09-13 NOTE — PROGRESS NOTES
Zosyn (Piperacillin/Tazobactam) Extended Infusion    Benedict Blackmon, a 78 y.o. yo female, has been converted to an extended infusion of Zosyn while in the intensive care unit. Extended infusions will run over 4 hours (240 minutes). Recent Labs     09/13/19  0430 09/12/19  1410   CREA 1.09 0.88     Ht Readings from Last 1 Encounters:   09/13/19 149.9 cm (59\")       Wt Readings from Last 1 Encounters:   09/13/19 48.1 kg (106 lb)         CrCl : Serum creatinine: 1.09 mg/dL 09/13/19 0430  Estimated creatinine clearance: 30.7 mL/min    Renal adjustment of extended infusion of Zosyn  3.375 every 8 hours for CrCl >/= 20 ml/min    ESTEPHANIA Damian, Pharm. D.   Clinical Pharmacist  [phone]

## 2019-09-13 NOTE — PROGRESS NOTES
INITIAL NUTRITION ASSESSMENT     RECOMMENDATIONS/PLAN:   Diet: Adv diet per MD  Avoid prolonged NPO diet order as it does not meet estimated needs (Day 1)  Other: Once pt has a diet add Danilo BID, MVI, and VIt C   Monitor labs/lytes, diet adv, skin integrity, wt, fluid status, BM  Adult Malnutrition Criteria:      Nutrition assessment was completed by RDN and the patient was found to meet the following malnutrition criteria established by ASPEN/AND:    Adult Malnutrition Guidelines:  SEVERE PROTEIN CALORIE MALNUTRITION IN THE CONTEXT OF CHRONIC ILLNESS  Body Fat:  Severe Depletion  Fluid Accumulation: Severe. Canary Back  09/13/19    REASON FOR ASSESSMENT:     []  RN Referral:    [x] MST score >/=2  Malnutrition Screening Tool (MST):     If Yes, How Much Weight Loss: Unsure  Eating Poorly Due to Decreased Appetite: No  MST Score: 2      NUTRITION ASSESSMENT:   Client History: 78 yrs old Female admitted with acute on chronic resp failure w/ hypoxia and hypercapnia on bipap in ICU, per MD note Cxr:Small, right greater than left pleural effusions with masslike opacity within inferior right lower lobe and lateral right upper lobe, COPD exacerbation, PE, HTN      PMHx: CKD, COPD, erythromelalgia, gait abnormally, HTN, hypoxia, muscle weakness, nicotine dependence, osteoporosis, respiratory failure, vit d deficiency    Cultural/Presybeterian Food Preferences: None Identified    FOOD/NUTRITION HISTORY  Diet History: unable to do NFPE as pt is meeting with pallative care; noted that pt has stage II ulcer POA, multiple other wounds POA, +4 edema, nurse reports muscle wasting and that pt is very bony    Food Allergies:  [x] NKFA     Pertinent PTA Medications: prednisone, lasix,      NUTRITION INTAKE   Diet Order:  NPO      Average PO Intake:       No data found.    Pertinent Medications:  [x] Reviewed; pepcid, lasix, hydrocortisone sod succ,   Electrolyte Replacement Protocol: []K  []Mg  []PO4    Insulin:  [x] SSI  [] Pre-meal   []  Basal   [] Drip  [] None  Pt expected to meet estimated nutrient needs through next review:          []  Yes     [x] No; NPO does not meet estimated needs  ANTHROPOMETRICS  Height: 4' 11\" (149.9 cm)       Weight: 48.1 kg (106 lb)    BMI: 21.4 kg/m^2  -  normal weight (18.5%-24.9% BMI)        Weight change: pt has gained wt per chart review                                   Comparison to Reference Standards:  IBW: 98 lbs      %IBW: 108%      AdjBW: n/a    NUTRITION-FOCUSED PHYSICAL ASSESSMENT  Skin: stage II PU POA. GI: No BM    BIOCHEMICAL DATA & MEDICAL TESTS  Pertinent Labs:  [x] Reviewed; [id number]  GFR est AA-58    NUTRITION PRESCRIPTION  Calories: 1260-3608 kcal/day based on 40-45kcal/kg  Protein: 58-72 g/day based on 1.2-1.5 g/kg  Fluid: 8328-2964  ml/day based on 1 kcal/ml      NUTRITION DIAGNOSES:   1. Malnutrition related to inadequate oral intake as evidence by stage II PU POA, +4 edema, nurse report    NUTRITION INTERVENTIONS:   INTERVENTIONS:        GOALS:  1. Diet: Adv diet per MD 1. Adv diet per  by next review 3 days     LEARNING NEEDS (Diet, Supplementation, Food/Nutrient-Drug Interaction):   [x] None Identified  [] Inpatient education provided/documented    [] Identified and patient:  [] Declined     [] Was not appropriate/indicated  NUTRITION MONITORING /EVALUATION:   Monitor wt  Monitor renal labs, electrolytes, fluid status    [x] Participated in Interdisciplinary Rounds  [x] Interdisciplinary Care Plan Reviewed/Documented  DISCHARGE NUTRITION RECOMMENDATIONS ADDRESSED:       [x] To be determined closer to discharge    NUTRITION RISK:     [x]  At risk                     []  Not currently at risk     Will follow-up per policy.   Raúl Ann 1

## 2019-09-13 NOTE — PROGRESS NOTES
I spoke to radiology CTa results still pending  If no PE would reduce Lovenox to prophylaxis currently on full dose   I have discussed with hospitalist  ABHIJEET Lal

## 2019-09-13 NOTE — CDMP QUERY
Pt admitted with acute respiratory failure./Pt noted to have history of chronic diastolic CHF .  If possible, please document in progress notes and d/c summary if you are evaluating and /or treating any of the following:      Acute on Chronic Systolic CHF   Acute on Chronic Diastolic CHF   Acute on Chronic Systolic and Diastolic CHF   Acute Systolic CHF   Acute Diastolic CHF   Acute Systolic and Diastolic CHF   Chronic Systolic CHF   Chronic Diastolic CHF   Chronic Systolic and Diastolic CHF   Other, please specify   Clinically unable to determine    The medical record reflects the following:    Risk Factors: CHF    Clinical Indicators: BNP 5,425, SOB, Bilateral + 2 pitting edema lower extremity      Treatment:   Lasix IV 20mg    Thank-you   Saritha Mccracken RN -[phone]

## 2019-09-13 NOTE — PROGRESS NOTES
1940-Bedside verbal report received from Tyler Suero RN. Sbar, mar, labs, kardex and patient status reviewed. Assumed care of patient. Pt to be given oral prednisone x3 doses prior to receiving CT for Iodine Contrast tomorrow. Pt unable to come off Bipap at this time. Notified Dr. Barrett Sultana. Orders received to change PO Prednisone to IV Solumedrol. 2000-Assessment completed. No changes from off-going report. 2130-RT in room and placed patient on 3lpm at this time. 2206-Reading through [address] notes that states that Solumedrol is not a suitable substitute for Contrast allergy per Radiologist, Dr. Alexandra Phalen and [address] Paged Dr. Barrett Sultana. 2478-[facility] policy \"Pre-treatment for contrast reaction\" states that 200mg Hydrocortisone IV is suitable substitute for oral Prednisone. Orders placed per protocol/Dr. Barrett Sultana. Will start regimen 3 doses at 0000. Pt increasingly more confused from previous assessment. Needing reminders of place and time. 2300-Pt continues to be more confused. Returned to Bipap at this time to prevent CO2 elevation. 2330-Gave 1st ordered dose of Solu-Cortef at this time. Shortly after, patient becoming increasingly more agitated and attempting to get out of bed. Attempted to redirect and unable. Called Supervisor to obtain a sitter for patient safety. Supervisor in room to assist with getting patient back to bed. Paged Dr. Barrett Sultana. Called daughterLori and left message to discuss patient's confusion acute/baseline. Waiting on return call. 2345-Dr. Nice returned page. Orders received for 2mg Haldol Q4hrs as needed for agitation. While attempting to give med, patient swing fists at nurses and kicking feet out of bed. 0000-Sitter at bedside. Pt beginning to settle down at this time. Will cont to monitor. 0400-Resting quietly on Bipap. Arouses easily to touch. Appears comfortable.     0450-DaughterLori returned call at this time. Per Cassie Nelson, states mother has been getting more confused at night and has been confrontational with her as well. Daughter made aware of PRN Haldol and agrees with plan of care. 0545-Critical CO2 >40 called from [facility] in lab. Notified Dr. Fina Alvarez. Orders received to repeat ABG. 0635-ABG repeated per RT. Per RT ABG's improving at this time.

## 2019-09-13 NOTE — PROGRESS NOTES
PATIENT RESTLESS. PULLING AT EQUIPMENT/LINES. REMOVED FROM BIPAP AND PLACED HER ON 3L NC.  SCHEDULED NEB TX GIVEN. PER DR. HOBBS, PATIENT MAY COME OFF BIPAP DURING DAY FOR SHORT PERIODS OF TIME AS TOLERATED.

## 2019-09-13 NOTE — INTERDISCIPLINARY ROUNDS
Interdisciplinary team rounds were held 9/13/2019 with the following team members:Nursing, Nutrition, Palliative Care, Pharmacy, Physician and Wound Care and the child(thi). Plan of care discussed. See clinical pathway and/or care plan for interventions and desired outcomes.

## 2019-09-14 NOTE — PROGRESS NOTES
SLP NOTE - SLP evaluation orders received. However, upon completion of chart review and discussion with RN, pt not appropriate for SLP evaluation this day. Currently, patient is:    []  Tolerating current po diet (per RN report)  []  Tolerating current po diet; however, poor po intake (per Rn report)   []  Receiving nutrition via tube feeding  [x]  Lethargic, solomnent, or difficult to arouse for safe po trials     []  Unable to manage secretions  []  Receiving intervention for respiratory distress  []  <6 hours s/p extubation   []  Other:     7765: Per d/w RN, Antonio Dorado, pt administered medication for agitation recently and may be lethargic. Pt's granddaughter and granddaughter's partner in room, reporting regular diet at nursing home prior to admission. Pt unable to be aroused with max verbal/tactile stim or sternal rub. D/w RN and Dr. James Gee, plan to complete RN swallow screen as pt becomes more alert and medically appropriate. Please contact SLP with questions/concerns. SLP will follow up next day.      Thank you for this referral.    Kate Cordova M.S., [address] [facility]  Speech-Language Pathologist

## 2019-09-14 NOTE — PROGRESS NOTES
Bedside and Verbal shift change report received from WARREN Bradshaw RN (offgoing nurse). Report included the following information SBAR, Kardex, Intake/Output, MAR and Recent Results. 2045 Shift assessment completed, see EMR    2200 Patient given haldol as ordered. Patient reach for things in open air, attempting to get out of bed, fiddling with cardiac monitor cable and patel, which requiring frequent reorientation. Sitter remained at bedside. 0045 Reassessment completed, see EMR. Patient asleep at this time, sitter remains at bedside. 1381 Patient broke bipap mask, was placed on 3L NC    0430 Reassessment completed, see EMR.     0500 Patient Spo2 dropped tp mid-80's due to mouth breathing on NC. Ventimask 6L placed on patient at this time. Patient spo2 Increased to low 90's. 0645 Patient's K level 3.0, notified Dr. Brigitte Marlow. Order given for 4 runs of 10 mEq IV. Bedside and Verbal shift change report given to WARREN Bradshaw RN (oncoming nurse)Report included the following information SBAR, Kardex, Intake/Output, MAR and Recent Results.

## 2019-09-14 NOTE — PROGRESS NOTES
Hospitalist Progress Note    Patient: Berenice Aguirre MRN: [id number]  CSN: [id number]    YOB: 1940  Age: 78 y.o. Sex: female    DOA: 9/12/2019 LOS:  LOS: 2 days              IMPRESSION and Plan:    Berenice Aguirre is a 78 y.o. female with   Patient Active Problem List    Diagnosis Date Noted    BiPAP (biphasic positive airway pressure) dependence 09/12/2019    Acute on chronic respiratory failure with hypoxia and hypercapnia ([address] 09/12/2019    COPD exacerbation ([address] 09/12/2019    Hypertension 09/12/2019    Severe protein-calorie malnutrition ([address] 09/12/2019    Thrombocytopenia ([address] 09/12/2019    Erythromelalgia ([address] 09/12/2019     Active Problems:    BiPAP (biphasic positive airway pressure) dependence (9/12/2019)      Acute on chronic respiratory failure with hypoxia and hypercapnia ([address] (9/12/2019)      COPD exacerbation ([address] (9/12/2019)      Hypertension (9/12/2019)      Severe protein-calorie malnutrition ([address] (9/12/2019)      Thrombocytopenia ([address] (9/12/2019)      Erythromelalgia ([address] (9/12/2019)      Lung mass    Plan:  D/w Dr. Thelma Molina  Cont BIPAP  Dc full dose lovenox  On abx  Lung mass w/u per pulmonary          Patient's condition is fair        Recommend to continue hospitalization. Discussed with patient. Chief Complaints:   Chief Complaint   Patient presents with    Respiratory Distress     SUBJECTIVE:  Pt is seen and examined. Chart reviwed. On BIPAP   No Fever, chills, Nausea, vomitting.            Review of systems:    Review of Systems   Unable to perform ROS: Severe respiratory distress       PE:  Patient Vitals for the past 24 hrs:   BP Temp Pulse Resp SpO2   09/14/19 1205     97 %   09/14/19 1119  98.4 °F (36.9 °C)      09/14/19 0900 137/55  92 20 97 %   09/14/19 0800 138/57  94 14 99 %   09/14/19 0716     96 %   09/14/19 0700 184/64  (!) 108 (!) 31 92 %   09/14/19 0650  97.9 °F (36.6 °C)      09/14/19 0641 177/65  95     09/14/19 2641 177/65  (!) 102 25 94 %   09/14/19 0617  98.1 °F (36.7 °C)   94 %   09/14/19 0612     91 %   09/14/19 0602     96 %   09/14/19 0529 164/78  88 14 96 %   09/14/19 0512   95 16 93 %   09/14/19 0511   93 20 92 %   09/14/19 0510   98 21 92 %   09/14/19 0509   99 23 92 %   09/14/19 0508 (!) 167/95  99 19 91 %   09/14/19 0506   (!) 101 27 91 %   09/14/19 0505   99 20 (!) 89 %   09/14/19 0504   99 27 (!) 87 %   09/14/19 0500   86 13 (!) 89 %   09/14/19 0459   84 13 (!) 88 %   09/14/19 0458   87 15 (!) 85 %   09/14/19 0400 143/52  88 14 93 %   09/14/19 0300 151/89  88 17 91 %   09/14/19 0230   95 24 98 %   09/14/19 0229   (!) 101 16 98 %   09/14/19 0228   (!) 106 14 99 %   09/14/19 0227   (!) 101 23 96 %   09/14/19 0222   (!) 102 22 (!) 86 %   09/14/19 0221   100 20 (!) 85 %   09/14/19 0220   (!) 103 18 (!) 87 %   09/14/19 0219   99 23 (!) 88 %   09/14/19 0200 137/43  74 14 95 %   09/14/19 0100 121/43  69 14 96 %   09/14/19 0024 (!) 135/94  91 15 95 %   09/14/19 0002   81 18 97 %   09/13/19 2303  98.1 °F (36.7 °C)      09/13/19 2300 126/43  88 19 96 %   09/13/19 2215     94 %   09/13/19 2200 173/49  (!) 108 24 91 %   09/13/19 2101 167/62  95 19 98 %   09/13/19 2100 198/60  99 26 100 %   09/13/19 2033 164/65  91 23 100 %   09/13/19 2025     96 %   09/13/19 1900 143/56  89 17 94 %   09/13/19 1855  98.2 °F (36.8 °C)      09/13/19 1800 (!) 127/103  93 24 96 %   09/13/19 1535  99.3 °F (37.4 °C)      09/13/19 1520     95 %   09/13/19 1400 159/41  96 23 93 %   09/13/19 1300 152/47  97 18 95 %       Intake/Output Summary (Last 24 hours) at 9/14/2019 1254  Last data filed at 9/14/2019 1119  Gross per 24 hour   Intake    Output 2975 ml   Net -2975 ml     Patient Vitals for the past 120 hrs:   Weight   09/12/19 1407 48.1 kg (106 lb)   09/13/19 1017 48.1 kg (106 lb)         Physical Exam   Constitutional: She is oriented to person, place, and time. She appears distressed. Neck: Normal range of motion. Neck supple. No JVD present. Cardiovascular: Normal rate, regular rhythm and normal heart sounds. Pulmonary/Chest: She is in respiratory distress. She has wheezes. Abdominal: Soft. Bowel sounds are normal. She exhibits no distension. There is no tenderness. There is no rebound. Musculoskeletal: Normal range of motion. She exhibits no edema. Neurological: She is alert and oriented to person, place, and time. Skin: Skin is warm and dry. Psychiatric: Affect normal.   Nursing note and vitals reviewed. Intake and Output:  Current Shift:  09/14 0701 - 09/14 1900  In: -   Out: 275 [Urine:275]  Last three shifts:  09/12 1901 - 09/14 0700  In: 450 [I.V.:450]  Out: 4275 [Urine:4275]    Lab/Data Reviewed:  Recent Results (from the past 8 hour(s))   GLUCOSE, POC    Collection Time: 09/14/19  5:12 AM   Result Value Ref Range    Glucose (POC) 113 (H) 70 - 110 mg/dL   POC G3    Collection Time: 09/14/19 10:10 AM   Result Value Ref Range    Device: BIPAP      FIO2 (POC) 35 %    pH (POC) 7.459 (H) 7.35 - 7.45      pCO2 (POC) 60.0 (H) 35.0 - 45.0 MMHG    pO2 (POC) 74 (L) 80 - 100 MMHG    HCO3 (POC) 42.6 (H) 22 - 26 MMOL/L    sO2 (POC) 95 92 - 97 %    Base excess (POC) 19 mmol/L    PEEP/CPAP (POC) 6.0 cmH2O    PIP (POC) 16      Allens test (POC) YES      Total resp.  rate 16      Site LEFT RADIAL      Specimen type (POC) ARTERIAL      Performed by Hannah Das     Spontaneous timed YES     GLUCOSE, POC    Collection Time: 09/14/19 11:17 AM   Result Value Ref Range    Glucose (POC) 114 (H) 70 - 110 mg/dL     Medications:  Current Facility-Administered Medications   Medication Dose Route Frequency    dexmedeTOMidine (PRECEDEX) 400 mcg in 0.9% sodium chloride 100 mL infusion  0.2-0.7 mcg/kg/hr IntraVENous TITRATE    heparin (porcine) injection 5,000 Units  5,000 Units SubCUTAneous Q8H    acetaZOLAMIDE (DIAMOX) 250 mg in sterile water (preservative free) 2.5 mL injection  250 mg IntraVENous ONCE    atenolol (TENORMIN) tablet 25 mg  25 mg Oral QPM    piperacillin-tazobactam (ZOSYN) 3.375 g in 0.9% sodium chloride (MBP/ADV) 100 mL MBP  3.375 g IntraVENous Q8H    methylPREDNISolone (PF) (SOLU-MEDROL) injection 40 mg  40 mg IntraVENous Q8H    vancomycin (VANCOCIN) 750 mg in 0.9% sodium chloride 250 mL (VIAL-MATE)  750 mg IntraVENous Q24H    Vancomycin-Rx to Dose & Monitor  1 Each Other Rx Dosing/Monitoring    albuterol-ipratropium (DUO-NEB) 2.5 MG-0.5 MG/3 ML  3 mL Nebulization QID RT    budesonide (PULMICORT) 500 mcg/2 ml nebulizer suspension  500 mcg Nebulization BID RT    doxycycline (VIBRAMYCIN) 100 mg in 0.9% sodium chloride (MBP/ADV) 100 mL MBP  100 mg IntraVENous Q12H    insulin lispro (HUMALOG) injection   SubCUTAneous Q6H    glucose chewable tablet 16 g  4 Tab Oral PRN    glucagon (GLUCAGEN) injection 1 mg  1 mg IntraMUSCular PRN    famotidine (PF) (PEPCID) 20 mg in sodium chloride 0.9% 10 mL injection  20 mg IntraVENous Q24H    nicotine (NICODERM CQ) 14 mg/24 hr patch 1 Patch  1 Patch TransDERmal Q24H    hydrALAZINE (APRESOLINE) 20 mg/mL injection 10 mg  10 mg IntraVENous Q6H PRN    haloperidol lactate (HALDOL) injection 2 mg  2 mg IntraMUSCular Q4H PRN       Recent Results (from the past 24 hour(s))   GLUCOSE, POC    Collection Time: 09/13/19  5:31 PM   Result Value Ref Range    Glucose (POC) 126 (H) 70 - 110 mg/dL   GLUCOSE, POC    Collection Time: 09/14/19 12:08 AM   Result Value Ref Range    Glucose (POC) 126 (H) 70 - 568 mg/dL   METABOLIC PANEL, BASIC    Collection Time: 09/14/19  4:08 AM   Result Value Ref Range    Sodium 141 136 - 145 mmol/L    Potassium 3.0 (L) 3.5 - 5.5 mmol/L    Chloride 92 (L) 100 - 111 mmol/L    CO2 43 (HH) 21 - 32 mmol/L    Anion gap 6 3.0 - 18 mmol/L    Glucose 109 (H) 74 - 99 mg/dL    BUN 25 (H) 7.0 - 18 MG/DL    Creatinine 1.06 0.6 - 1.3 MG/DL    BUN/Creatinine ratio 24 (H) 12 - 20      GFR est AA >60 >60 ml/min/1.73m2    GFR est non-AA 50 (L) >60 ml/min/1.73m2    Calcium 8.3 (L) 8.5 - 10.1 MG/DL   CBC WITH AUTOMATED DIFF    Collection Time: 09/14/19  4:08 AM   Result Value Ref Range    WBC 2.7 (L) 4.6 - 13.2 K/uL    RBC 3.40 (L) 4.20 - 5.30 M/uL    HGB 10.1 (L) 12.0 - 16.0 g/dL    HCT 31.9 (L) 35.0 - 45.0 %    MCV 93.8 74.0 - 97.0 FL    MCH 29.7 24.0 - 34.0 PG    MCHC 31.7 31.0 - 37.0 g/dL    RDW 14.0 11.6 - 14.5 %    PLATELET 78 (L) 475 - 420 K/uL    MPV 10.9 9.2 - 11.8 FL    NEUTROPHILS 83 (H) 40 - 73 %    LYMPHOCYTES 13 (L) 21 - 52 %    MONOCYTES 4 3 - 10 %    EOSINOPHILS 0 0 - 5 %    BASOPHILS 0 0 - 2 %    ABS. NEUTROPHILS 2.3 1.8 - 8.0 K/UL    ABS. LYMPHOCYTES 0.3 (L) 0.9 - 3.6 K/UL    ABS. MONOCYTES 0.1 0.05 - 1.2 K/UL    ABS. EOSINOPHILS 0.0 0.0 - 0.4 K/UL    ABS. BASOPHILS 0.0 0.0 - 0.1 K/UL    DF AUTOMATED     MAGNESIUM    Collection Time: 09/14/19  4:08 AM   Result Value Ref Range    Magnesium 2.1 1.6 - 2.6 mg/dL   LACTIC ACID    Collection Time: 09/14/19  4:08 AM   Result Value Ref Range    Lactic acid 0.8 0.4 - 2.0 MMOL/L   HEPATIC FUNCTION PANEL    Collection Time: 09/14/19  4:08 AM   Result Value Ref Range    Protein, total 4.8 (L) 6.4 - 8.2 g/dL    Albumin 2.7 (L) 3.4 - 5.0 g/dL    Globulin 2.1 2.0 - 4.0 g/dL    A-G Ratio 1.3 0.8 - 1.7      Bilirubin, total 0.7 0.2 - 1.0 MG/DL    Bilirubin, direct 0.3 (H) 0.0 - 0.2 MG/DL    Alk.  phosphatase 64 45 - 117 U/L    AST (SGOT) 22 10 - 38 U/L    ALT (SGPT) 25 13 - 56 U/L   PROTHROMBIN TIME + INR    Collection Time: 09/14/19  4:08 AM   Result Value Ref Range    Prothrombin time 13.5 11.5 - 15.2 sec    INR 1.1 0.8 - 1.2     GLUCOSE, POC    Collection Time: 09/14/19  5:12 AM   Result Value Ref Range    Glucose (POC) 113 (H) 70 - 110 mg/dL   POC G3    Collection Time: 09/14/19 10:10 AM   Result Value Ref Range    Device: BIPAP      FIO2 (POC) 35 %    pH (POC) 7.459 (H) 7.35 - 7.45      pCO2 (POC) 60.0 (H) 35.0 - 45.0 MMHG    pO2 (POC) 74 (L) 80 - 100 MMHG    HCO3 (POC) 42.6 (H) 22 - 26 MMOL/L    sO2 (POC) 95 92 - 97 %    Base excess (POC) 19 mmol/L    PEEP/CPAP (POC) 6.0 cmH2O    PIP (POC) 16      Allens test (POC) YES      Total resp. rate 16      Site LEFT RADIAL      Specimen type (POC) ARTERIAL      Performed by Ronald Khan     Spontaneous timed YES     GLUCOSE, POC    Collection Time: 09/14/19 11:17 AM   Result Value Ref Range    Glucose (POC) 114 (H) 70 - 110 mg/dL      IMPRESSION  IMPRESSION:     No CT evidence of pulmonary thromboembolism. Severe emphysema with a large right lower lobe mass abutting the major fissure  and the diaphragm measuring up to 3.1 cm in maximal diameter. There is also a spiculated pleural-based mass in the right upper lobe measuring  1.8 cm at the base. 0.6 cm left lower lobe lung nodule present. These are suspicious for malignancy and metastatic disease. PET CT may be  helpful for further evaluation. Bilateral pleural effusions with adjacent atelectasis larger on the right. Other incidental findings as described above.     Procedures/imaging: see electronic medical records for all procedures/Xrays and details which were not copied into this note but were reviewed prior to creation of Negin Norman MD   9/14/2019, 12:54 PM

## 2019-09-14 NOTE — PROGRESS NOTES
PATIENT SOB ON 31% VM. TOLERATED RETURN TO BIPAP @ 16/6 WITH FIO2 35%. BS DIMINISHED BILATERALLY. SCHEDULED NEB TX GIVEN.

## 2019-09-14 NOTE — PROGRESS NOTES
UNABLE TO OBTAIN ABG AT THIS TIME. PATIENT RESISTANT TO GIVING ACCESS TO ARMS. WILL ATTEMPT LATER. Right UE ROM was WNL (within normal limits)/Right LE ROM was WNL (within normal limits)

## 2019-09-14 NOTE — PROGRESS NOTES
Pt has been very uncooperative with wearing bipap  tonight. Though we have made multiple attempts to encourage pt to use bipap, pt has broken 2 masks trying to pull the mask off. Pt is very agitated and pulls at everything around her. Pt tolerated wearing bipap a total of 4 hours overnight. Pt is now wearing 31% venti-mask. RN aware. Will continue to monitor.

## 2019-09-14 NOTE — PROGRESS NOTES
Bedside and Verbal shift change report given to [address] [place] [address] (oncoming nurse) by Iliana Hernandez (offgoing nurse). Report included the following information SBAR, Kardex, Intake/Output, Med Rec Status and Cardiac Rhythm NSR.     0745 hrs Shift assessment completed. PT on BIPAP tolerated well.see flow sheet    0944 hrs Pt restless, agitated. Started precedex gtt's per Dr Sakshi Forman. Sitter at bedside. 1200 hrs reassessed pt. PT calm, tolerated o2 at 3 L NC at this time. NAD. Staff present. 1300 hrs Speech therapy at bedside. 1600 hrs Reassessed pt     1810 hrs called hospitalist k results 3.5    1825 hrs Pt placed on bedpan and repositioned. Started to desating  Placed back on Bipap. Rt at bedside. Back to 98% no apparent distress. 1840 hrs Per Dr Federico Ashford iniciated K replacement protocol. Bedside and Verbal shift change report given to [address] [place] [address] (oncoming nurse) by Kyrie Decker (offgoing nurse). Report included the following information SBAR, Kardex, ED Summary, Intake/Output, Med Rec Status and Cardiac Rhythm NSR.

## 2019-09-14 NOTE — PROGRESS NOTES
NUTRITION UPDATE    - pt remains on BiPap and remains NPO; would recc adv diet when clinically able as it does not meet estimated needs    Joseph Mendoza, RD  PAGER:  [phone]

## 2019-09-14 NOTE — PROGRESS NOTES
PATIENT HAD DESATURATED TO 68% WHILE BEING PLACED ON BEDPAN AND BEING CLEANED. BIPAP WAS RESUMED @ 16/6 WITH FIO2 35%. SPO2 IMPROVED TO 98%.

## 2019-09-14 NOTE — CONSULTS
[facility]  Pulmonary, Critical Care, and Sleep Medicine    Name: Reinaldo Costa MRN: [id number]   : 1940 Hospital: [facility] [place]    Date: 2019  Room: 101/01     [facility] Note                                              Consult requesting physician: Dr. Mica Rizo  Reason for Consult: respiratory failure, pneumonia, ? Lung mass, CHF    Subjective/History of Present Illness:     Patient is a 78 y.o. female with PMHx significant for COPD recently  placed on BIPAP and oxygen ( 5L and BIPAP at [place] [facility] to rehab) CHF ( diastolic dysfunction / ) recent pneumonia/dysphagia/chornicn edema of lower extremities ( 3 years)  She stopped smoking about 3 weeks ago, heavy smoker for over 50 years, no pulmonary follow up until last hospitalization. At rehab patient was taking her bronchodilators but was refusing NIV. At rehab she was noticed to have saturation 63%   Today sob/cough no choking episodes. No chest pain. Previous PVL was negative. No CT evidence of pulmonary thromboembolism. Severe emphysema with a large right lower lobe mass abutting the major fissure  and the diaphragm measuring up to 3.1 cm in maximal diameter. There is also a spiculated pleural-based mass in the right upper lobe measuring  1.8 cm at the base. 0.6 cm left lower lobe lung nodule present. These are suspicious for malignancy and metastatic disease. PET CT may be  helpful for further evaluation. Bilateral pleural effusions with adjacent atelectasis larger on the right. Other incidental findings as described above.          2019:  Dc full dose lovenox no pe  Severe emphysema and lung mass     yesterday confirmed that patient  signed DNR/DNI in august a copy in a chart  continue bIPAP all night and daytime prn  Last night very agiated poro response to haldol  I will order precedex if agiated please start lwo dose as toelrated  bicabonate betetr aftre diamox give 1 more dose today     For agiation precedex as needed         9/13/2019  Overnight was very agitated but after  Haldol was finally able to tolerate BIPAP  Today was off BIPAP for short period but sob back on BIPAP  ABg close to baseline  Now contraction alkalosis so add 1 dose diamox hold lasix for 24 hrs   Very poor  prognosis  Today consulted palliative to clarify code status ( reports from [place]  indicate was DNR/DNI ? )  We will confirm with patient and family  I asked patient today but she was too confused to answer  panculure  CTa today evaluated  for both PE and mass pneumonia  Chronic  thrombocytopenia of unclear etiology  We have checked the records at [facility] and was low <100   Monitor  Echo  Diastolic CHF   Trop   ecg   Lactic acid normla     On lovenox full dose if no pe change to SQ heparin     9/12/2019  BIPAP  abg follow up in 2-3 hrs   bronchodilators   Steroids abx  Gentle diuresis mixed pattern might need diamox tomorrow  if bicarbonate worse  Echo  Trop  pvl  CTA when more stable r/o lung mass  I had discussion with patient  and       I/O last 24 hrs: Intake/Output Summary (Last 24 hours) at 9/14/2019 1017  Last data filed at 9/14/2019 0650  Gross per 24 hour   Intake    Output 2700 ml   Net -2700 ml         The patient is critically ill and can not provide additional history due to  Respiratory failure     History taken from  Family and partially from pateitn     Review of Systems:  A comprehensive review of systems was negative except for that written in the HPI.        Review of Systems:   HEENT: No epistaxis, no nasal drainage, no difficulty in swallowing, no redness in eyes  Respiratory: as above  Cardiovascular: no chest pain, no palpitations, no chronic leg edema, no syncope  Gastrointestinal: no abd pain, no vomiting, no diarrhea, no bleeding symptoms  Genitourinary: No urinary symptoms or hematuria  Musculoskeletal:Neg  Neurological: No focal weakness, no seizures, no headaches  Behvioral/Psych: No anxiety, no depression  Constitutional: No fever, no chills, no weight loss, no night sweats     Allergies   Allergen Reactions    Iodine Hives      Past Medical History:   Diagnosis Date    Chronic kidney disease (CKD)     COPD (chronic obstructive pulmonary disease) (HCC)     Erythromelalgia (HCC)     Gait abnormality     Hypertension     Hypoxia     Muscle weakness (generalized)     Nicotine dependence     Osteoporosis     Respiratory failure ([facility] [address]     Vitamin D deficiency       History reviewed. No pertinent surgical history. Social History     Tobacco Use    Smoking status: Former Smoker    Smokeless tobacco: Never Used   Substance Use Topics    Alcohol use: Not on file      History reviewed. No pertinent family history. Prior to Admission medications    Medication Sig Start Date End Date Taking? Authorizing Provider   fluticasone furoate-vilanterol (BREO ELLIPTA) 100-25 mcg/dose inhaler Take 1 Puff by inhalation daily. Yes Randy, MD Pepe   nicotine (NICODERM CQ) 14 mg/24 hr patch 1 Patch by TransDERmal route every twenty-four (24) hours. Yes Randy, MD Pepe   predniSONE (DELTASONE) 20 mg tablet Take 20 mg by mouth Every Friday. Yes Pepe Padilla MD   furosemide (LASIX) 20 mg tablet Take 20 mg by mouth daily. Yes Randy, MD Pepe   atenolol (TENORMIN) 50 mg tablet Take  by mouth daily. Yes Pepe Padilla MD   atenolol (TENORMIN) 25 mg tablet Take 25 mg by mouth every evening. Yes Randy, MD Pepe   lisinopril-hydroCHLOROthiazide (PRINZIDE, ZESTORETIC) 20-12.5 mg per tablet Take 1 Tab by mouth daily. Yes Pepe Padilla MD   tiotropium (SPIRIVA WITH HANDIHALER) 18 mcg inhalation capsule Take 1 Cap by inhalation daily. Yes Randy, MD Pepe   raNITIdine (ZANTAC) 150 mg tablet Take 150 mg by mouth daily. Yes Pepe Padilla MD   bumetanide (BUMEX) 2 mg tablet Take 2 mg by mouth two (2) times a day.    Yes Pepe Padilla MD     Current Facility-Administered Medications   Medication Dose Route Frequency    potassium chloride 10 mEq in 100 ml IVPB  10 mEq IntraVENous Q1H    dexmedeTOMidine (PRECEDEX) 400 mcg in 0.9% sodium chloride 100 mL infusion  0.2-0.7 mcg/kg/hr IntraVENous TITRATE    atenolol (TENORMIN) tablet 25 mg  25 mg Oral QPM    piperacillin-tazobactam (ZOSYN) 3.375 g in 0.9% sodium chloride (MBP/ADV) 100 mL MBP  3.375 g IntraVENous Q8H    methylPREDNISolone (PF) (SOLU-MEDROL) injection 40 mg  40 mg IntraVENous Q8H    vancomycin (VANCOCIN) 750 mg in 0.9% sodium chloride 250 mL (VIAL-MATE)  750 mg IntraVENous Q24H    Vancomycin-Rx to Dose & Monitor  1 Each Other Rx Dosing/Monitoring    albuterol-ipratropium (DUO-NEB) 2.5 MG-0.5 MG/3 ML  3 mL Nebulization QID RT    budesonide (PULMICORT) 500 mcg/2 ml nebulizer suspension  500 mcg Nebulization BID RT    doxycycline (VIBRAMYCIN) 100 mg in 0.9% sodium chloride (MBP/ADV) 100 mL MBP  100 mg IntraVENous Q12H    insulin lispro (HUMALOG) injection   SubCUTAneous Q6H    famotidine (PF) (PEPCID) 20 mg in sodium chloride 0.9% 10 mL injection  20 mg IntraVENous Q24H    nicotine (NICODERM CQ) 14 mg/24 hr patch 1 Patch  1 Patch TransDERmal Q24H         Objective:   Vital Signs:    Visit Vitals  /64   Pulse (!) 108   Temp 97.9 °F (36.6 °C)   Resp (!) 31   Ht 4' 11\" (1.499 m)   Wt 48.1 kg (106 lb)   SpO2 96%   BMI 21.41 kg/m²       O2 Device: BIPAP   O2 Flow Rate (L/min): 6 l/min   Temp (24hrs), Av.3 °F (36.8 °C), Min:97.9 °F (36.6 °C), Max:99.3 °F (37.4 °C)       Intake/Output:   Last shift:      No intake/output data recorded.     Last 3 shifts:  1901 -  0700  In: 450 [I.V.:450]  Out: 4275 [Urine:4275]      Intake/Output Summary (Last 24 hours) at 2019 1017  Last data filed at 2019 0650  Gross per 24 hour   Intake    Output 2700 ml   Net -2700 ml       Last 3 Recorded Weights in this Encounter    19 1407 19 1017   Weight: 48.1 kg (106 lb) 48.1 kg (106 lb)       BIPAP 16/7 fio2 35 %    Recent Labs 09/14/19  1010 09/13/19  0631 09/12/19 2050   PHI 7.459* 7.461* 7.394   PCO2I 60.0* 71.5* 76.7*   PO2I 74* 72* 88   HCO3I 42.6* 51.1* 47.3*   FIO2I 35 0.35 0.35       Physical Exam:     General/Neurology: Alert, Awake in moderate respiratory distress    Head:   Normocephalic, without obvious abnormality, atraumatic. Eye:   EOM intact  no scleral icterus, no pallor, no cyanosis. Nose:   No sinus tenderness  Throat:  Lips, mucosa, and tongue normal. No oral thrush. Neck:   Supple, symmetric. No lymphadenopathy. Trachea midline  Lung:   Bilateral crckles mild rhonchi   Heart:   Regular rate & rhythm. S1 S2 present. No murmur. No JVD. Abdomen:  Soft. NT. ND. +BS. No masses. Extremities:  Bilateral 3 plus edema . No cyanosis. No clubbing. Pulses: weak present   Lymphatic:  No cervical or supraclavicular palpable lymphadenopathy. Musculoskeletal: No joint swelling. No tenderness.    Skin:    red tender lwoer extremites       Data:       Recent Results (from the past 24 hour(s))   ECHO ADULT FOLLOW-UP OR LIMITED    Collection Time: 09/13/19 10:18 AM   Result Value Ref Range    Ao Root D 3.20 cm    LVIDd 4.12 3.9 - 5.3 cm    LVPWd 0.86 0.6 - 0.9 cm    LVIDs 2.57 cm    IVSd 0.89 0.6 - 0.9 cm    LV ED Vol A2C 32.4 mL    LV ES Vol A4C 10.4 mL    LV ES Vol BP 12.0 (A) 19 - 49 mL    LVOT d 1.88 cm    LV E' Septal Velocity 4.00 cm/s    LV E' Lateral Velocity 6.00 cm/s    MVA (PHT) 7.1 cm2    MV A Ross 190.51 cm/s    MV E Ross 115.60 cm/s    MV E/A 0.61     BP EF 64.8 55 - 100 %    LV Ejection Fraction MOD 4C 68 %    LV Ejection Fraction MOD 2C 60 %    LV Mass .2 67 - 162 g    LV Mass AL Index 87.5 43 - 95 g/m2    E/E' lateral 19.27     E/E' septal 28.90     E/E' ratio (averaged) 24.08     LV ES Vol A2C 13.1 mL    LVES Vol Index BP 8.5 mL/m2    LV ED Vol A4C 32.3 mL    LVED Vol Index BP 24.1 mL/m2    Mitral Valve E Wave Deceleration Time 106.8 ms    Mitral Valve Pressure Half-time 31.0 ms    Triscuspid Valve Regurgitation Peak Gradient 31.3 mmHg    LV ED Vol BP 34.0 (A) 56 - 104 ml    TR Max Velocity 279.91 cm/s    LVED Vol Index A4C 22.9 mL/m2    LVED Vol Index A2C 23.0 mL/m2    LVES Vol Index A4C 7.4 mL/m2    LVES Vol Index A2C 9.3 mL/m2   GLUCOSE, POC    Collection Time: 09/13/19 11:26 AM   Result Value Ref Range    Glucose (POC) 112 (H) 70 - 110 mg/dL   CULTURE, BLOOD    Collection Time: 09/13/19 11:43 AM   Result Value Ref Range    Special Requests: NO SPECIAL REQUESTS      Culture result: NO GROWTH AFTER 18 HOURS     CULTURE, BLOOD    Collection Time: 09/13/19 11:43 AM   Result Value Ref Range    Special Requests: NO SPECIAL REQUESTS      Culture result: NO GROWTH AFTER 18 HOURS     LACTIC ACID    Collection Time: 09/13/19 11:43 AM   Result Value Ref Range    Lactic acid 1.6 0.4 - 2.0 MMOL/L   GLUCOSE, POC    Collection Time: 09/13/19  5:31 PM   Result Value Ref Range    Glucose (POC) 126 (H) 70 - 110 mg/dL   GLUCOSE, POC    Collection Time: 09/14/19 12:08 AM   Result Value Ref Range    Glucose (POC) 126 (H) 70 - 379 mg/dL   METABOLIC PANEL, BASIC    Collection Time: 09/14/19  4:08 AM   Result Value Ref Range    Sodium 141 136 - 145 mmol/L    Potassium 3.0 (L) 3.5 - 5.5 mmol/L    Chloride 92 (L) 100 - 111 mmol/L    CO2 43 (HH) 21 - 32 mmol/L    Anion gap 6 3.0 - 18 mmol/L    Glucose 109 (H) 74 - 99 mg/dL    BUN 25 (H) 7.0 - 18 MG/DL    Creatinine 1.06 0.6 - 1.3 MG/DL    BUN/Creatinine ratio 24 (H) 12 - 20      GFR est AA >60 >60 ml/min/1.73m2    GFR est non-AA 50 (L) >60 ml/min/1.73m2    Calcium 8.3 (L) 8.5 - 10.1 MG/DL   CBC WITH AUTOMATED DIFF    Collection Time: 09/14/19  4:08 AM   Result Value Ref Range    WBC 2.7 (L) 4.6 - 13.2 K/uL    RBC 3.40 (L) 4.20 - 5.30 M/uL    HGB 10.1 (L) 12.0 - 16.0 g/dL    HCT 31.9 (L) 35.0 - 45.0 %    MCV 93.8 74.0 - 97.0 FL    MCH 29.7 24.0 - 34.0 PG    MCHC 31.7 31.0 - 37.0 g/dL    RDW 14.0 11.6 - 14.5 %    PLATELET 78 (L) 760 - 420 K/uL    MPV 10.9 9.2 - 11.8 FL    NEUTROPHILS 83 (H) 40 - 73 %    LYMPHOCYTES 13 (L) 21 - 52 %    MONOCYTES 4 3 - 10 %    EOSINOPHILS 0 0 - 5 %    BASOPHILS 0 0 - 2 %    ABS. NEUTROPHILS 2.3 1.8 - 8.0 K/UL    ABS. LYMPHOCYTES 0.3 (L) 0.9 - 3.6 K/UL    ABS. MONOCYTES 0.1 0.05 - 1.2 K/UL    ABS. EOSINOPHILS 0.0 0.0 - 0.4 K/UL    ABS. BASOPHILS 0.0 0.0 - 0.1 K/UL    DF AUTOMATED     MAGNESIUM    Collection Time: 09/14/19  4:08 AM   Result Value Ref Range    Magnesium 2.1 1.6 - 2.6 mg/dL   LACTIC ACID    Collection Time: 09/14/19  4:08 AM   Result Value Ref Range    Lactic acid 0.8 0.4 - 2.0 MMOL/L   HEPATIC FUNCTION PANEL    Collection Time: 09/14/19  4:08 AM   Result Value Ref Range    Protein, total 4.8 (L) 6.4 - 8.2 g/dL    Albumin 2.7 (L) 3.4 - 5.0 g/dL    Globulin 2.1 2.0 - 4.0 g/dL    A-G Ratio 1.3 0.8 - 1.7      Bilirubin, total 0.7 0.2 - 1.0 MG/DL    Bilirubin, direct 0.3 (H) 0.0 - 0.2 MG/DL    Alk. phosphatase 64 45 - 117 U/L    AST (SGOT) 22 10 - 38 U/L    ALT (SGPT) 25 13 - 56 U/L   PROTHROMBIN TIME + INR    Collection Time: 09/14/19  4:08 AM   Result Value Ref Range    Prothrombin time 13.5 11.5 - 15.2 sec    INR 1.1 0.8 - 1.2     GLUCOSE, POC    Collection Time: 09/14/19  5:12 AM   Result Value Ref Range    Glucose (POC) 113 (H) 70 - 110 mg/dL   POC G3    Collection Time: 09/14/19 10:10 AM   Result Value Ref Range    Device: BIPAP      FIO2 (POC) 35 %    pH (POC) 7.459 (H) 7.35 - 7.45      pCO2 (POC) 60.0 (H) 35.0 - 45.0 MMHG    pO2 (POC) 74 (L) 80 - 100 MMHG    HCO3 (POC) 42.6 (H) 22 - 26 MMOL/L    sO2 (POC) 95 92 - 97 %    Base excess (POC) 19 mmol/L    PEEP/CPAP (POC) 6.0 cmH2O    PIP (POC) 16      Allens test (POC) YES      Total resp.  rate 16      Site LEFT RADIAL      Specimen type (POC) ARTERIAL      Performed by Gabriel Nascimento     Spontaneous timed YES           Chemistry Recent Labs     09/14/19  0408 09/13/19  0430 09/12/19  1410   * 154* 95    140 140   K 3.0* 3.9 3.9   CL 92* 87* 89*   CO2 43* >45* >45*   BUN 25* 22* 20*   CREA 1.06 1.09 0.88   CA 8.3* 8.3* 8.8   MG 2.1 2.1  --    AGAP 6 Cannot be calculated Cannot be calculated   BUCR 24* 20 23*   AP 64  --  108   TP 4.8*  --  6.2*   ALB 2.7*  --  3.6   GLOB 2.1  --  2.6   AGRAT 1.3  --  1.4        Lactic Acid Lactic acid   Date Value Ref Range Status   09/14/2019 0.8 0.4 - 2.0 MMOL/L Final     Recent Labs     09/14/19  0408 09/13/19  1143   LAC 0.8 1.6        Liver Enzymes Protein, total   Date Value Ref Range Status   09/14/2019 4.8 (L) 6.4 - 8.2 g/dL Final     Albumin   Date Value Ref Range Status   09/14/2019 2.7 (L) 3.4 - 5.0 g/dL Final     Globulin   Date Value Ref Range Status   09/14/2019 2.1 2.0 - 4.0 g/dL Final     A-G Ratio   Date Value Ref Range Status   09/14/2019 1.3 0.8 - 1.7   Final     AST (SGOT)   Date Value Ref Range Status   09/14/2019 22 10 - 38 U/L Final     Alk.  phosphatase   Date Value Ref Range Status   09/14/2019 64 45 - 117 U/L Final     Recent Labs     09/14/19  0408 09/12/19  1410   TP 4.8* 6.2*   ALB 2.7* 3.6   GLOB 2.1 2.6   AGRAT 1.3 1.4   SGOT 22 21   AP 64 108        CBC w/Diff Recent Labs     09/14/19  0408 09/13/19  0430 09/12/19  1410   WBC 2.7* 2.7* 3.4*   RBC 3.40* 3.89* 4.59   HGB 10.1* 11.3* 13.3   HCT 31.9* 37.4 45.6*   PLT 78* 82* 83*   GRANS 83* 83* 71   LYMPH 13* 15* 22   EOS 0 0 1        Cardiac Enzymes No results found for: CPK, CK, CKMMB, CKMB, RCK3, CKMBT, CKNDX, CKND1, ENEIDA, TROPT, TROIQ, BIB, TROPT, TNIPOC, BNP, BNPP     BNP No results found for: BNP, BNPP, XBNPT     Coagulation Recent Labs     09/14/19  0408   PTP 13.5   INR 1.1         Thyroid  No results found for: T4, T3U, TSH, TSHEXT, TSHEXT    No results found for: T4     Urinalysis No results found for: COLOR, APPRN, SPGRU, REFSG, TRISTON, PROTU, GLUCU, KETU, BILU, UROU, BRAXTON, LEUKU, GLUKE, EPSU, BACTU, WBCU, RBCU, CASTS, UCRY     Micro  Recent Labs     09/13/19  1143   CULT NO GROWTH AFTER 18 HOURS  NO GROWTH AFTER 18 HOURS     Recent Labs     09/13/19  1143   CULT NO GROWTH AFTER 18 HOURS  NO GROWTH AFTER 18 HOURS          Culture data during this hospitalization. All Micro Results     Procedure Component Value Units Date/Time    CULTURE, BLOOD [[id number]] Collected:  09/13/19 1143    Order Status:  Completed Specimen:  Whole Blood Updated:  09/14/19 0652     Special Requests: NO SPECIAL REQUESTS        Culture result: NO GROWTH AFTER 18 HOURS       CULTURE, BLOOD [[id number]] Collected:  09/13/19 1143    Order Status:  Completed Specimen:  Whole Blood Updated:  09/14/19 0652     Special Requests: NO SPECIAL REQUESTS        Culture result: NO GROWTH AFTER 18 HOURS       CULTURE, URINE [[id number]]     Order Status:  Sent Specimen:  Cath Urine     STREP PNEUMO AG, URINE [[id number]]     Order Status:  Sent Specimen:  Urine     LEGIONELLA PNEUMOPHILA AG, URINE [[id number]]     Order Status:  Sent Specimen:  Urine                PFT       Ultrasound       LE Doppler       ECHO       Images report reviewed by me:  CT (Most Recent) (CT chest reviewed by me) No results found for this or any previous visit. CXR reviewed by me:  XR (Most Recent). CXR  reviewed by me and compared with previous CXR Results from Hospital Encounter encounter on 09/12/19   XR CHEST PORT    Narrative EXAM: CHEST RADIOGRAPH, SINGLE VIEW    CLINICAL INDICATION/HISTORY: Shortness of breath    COMPARISON: None. TECHNIQUE: Portable frontal view of the chest was obtained.     _______________    FINDINGS:    SUPPORT DEVICES: None. HEART AND MEDIASTINUM: Cardiomediastinal silhouette appears within normal  limits. Normal caliber thoracic aorta. No central vascular congestion. Asymmetric right hilar convexity    LUNGS AND PLEURAL SPACES: Small, right greater than left pleural effusions, each  with adjacent basilar atelectasis. Asymmetric, right greater than left apical  capping is also present. Rounded opacity is seen within the inferior aspect of  the right lower lobe.  There is also asymmetric pleural-based wedge-shaped  opacity within the lateral aspect of the right upper lobe. BONY THORAX AND SOFT TISSUES: No acute osseous abnormality. Moderate  degenerative changes of right and left shoulders with left-sided rotator cuff  arthropathy. _______________      Impression IMPRESSION:    Small, right greater than left pleural effusions with masslike opacity within  inferior right lower lobe and lateral right upper lobe. Asymmetric right hilar  convexity, concerning for lymphadenopathy. Recommend chest CT to evaluate  cluster of findings which are concerning for malignancy. IMPRESSION:   · Acute on chronic  hypercarbic and hypoxemic respiratory failure- COPd exacerbation. I have reviewed data and she had been hospitalized . Non complaint with NIV on 5L at [facility] . Resume NIV . Copd tx   · Acute on chronic  CHF- ? Diastolic dysfunction - not clear worse now patient was on diuretics and had normal EF. Low albumins , chronic  edema. Gentle diuresis if bicarbonate worse might need Diamox. echo trop ecg   · Pneumonia ? HAP recently NH/hospital or aspiration van zosyn doxy . Urine strep leg  · Possible lung mass once more stable CTa for both Pe and lung mass   · Hypoxemia- multifactorial COPD/CHF diastolic/ pneumonia vs lung mass. Less likley PE but have to exclude   · agiation precedex as needed   Patient Active Problem List   Diagnosis Code    BiPAP (biphasic positive airway pressure) dependence Z99.89    Acute on chronic respiratory failure with hypoxia and hypercapnia (HCC) J96.21, J96.22    COPD exacerbation (HCC) J44.1    Hypertension I10    Severe protein-calorie malnutrition (HCC) E43    Thrombocytopenia (HCC) D69.6    Erythromelalgia ([facility] [address] I73.81           · Code status: I had a discussion about code status full code       RECOMMENDATIONS:   Respiratory:  BIPAP 16/7 fio2 30   bronchodilators  abg   Gentle dieresis  abx   Once  more stable CTA.  Ok to give full dose lovenox in a meantime   Now PVL less likely PE more aspiration pneumonia vs mass   Protecting airway but might require intubation   Keep SPO2 >=92%. HOB 30 degree elevation all the time. Aggressive pulmonary toileting. Aspiration precautions. Incentive spirometry. CVS: ? diastolic CHf ? BNP high r/o PE as well less  likely . Had normal echo 1 month ago. Trop ecg   ID: pneumonia aspiration vs HAP  Van zosyn doxy   Sepsis bundle and protocol followed. Follow serial lactic acid, frequent BMP check, fluid resuscitation. Follow cultures. Deescalate antibiotic when appropriate. Hematology/Oncology: follow wbc   Renal: daily high bicabonate if higher hold lasix and give diamox   GI/: ppi   Endocrine: Monitor BS. SSI. NPain/Sedation: prn   Skin/Wound: legs swollen tender erythema   Electrolytes: Replace electrolytes per ICU electrolyte replacement protocol. IVF: kvo  Nutrition: hold today   Prophylaxis: DVT Prophylaxis:  Full dose lovenox  GI Prophylaxis:   patel  Advance Directive/Palliative Care: consulted    Will defer respective systems problem management to primary and other respective consultant and follow patient in ICU with primary and other medical team.  Further recommendations will be based on the patient's response to recommended treatment and results of the investigation ordered. Quality Care: PPI, DVT prophylaxis, HOB elevated, Infection control all reviewed and addressed. Care of plan d/w RN, RT, MDR.  D/w patient and family     Moderate complexity decision making was performed during the evaluation of this patient at high risk for decompensation with multiple organ involvement. Total critical care time spent rendering care exclusive of procedures: 60  minutes.          Vicki Alvarado MD  9/14/2019

## 2019-09-15 NOTE — PROGRESS NOTES
1915-Bedside verbal report received from A. Zadie Epley, RN. Sbar, mar, labs, kardex and patient status reviewed. Assumed care of patient. 2000-Assessment completed. Alert and oriented x4 at this time. Appropriate for age cognition. No complaints at this time. Precidex drip dc'd, stopped at this time. Will monitor for s/s of agitation/phsychosis. 2100-Aide/sitter assisted pt to bedside commode at this time. O2 sats down to 77% with movement. Pt refuses Bipap at this time. When returned to sitting position, sats returned to baseline (92-95%) after approx 8 mins. Remains alert and oriented. 0000-Assessment competed. No complaints at this time. 0215-Pt turning to get off bedpan. Sat's down to 67-72% at this time. Placed on non-rebreather @10lpm. Diffuse crackles auscultated bilaterally. Decreased urine output. Paged Dr. Misael Benson and orders received for 20mg Lasix IVP x1. /60, prn Hydralazine given per orders for BP. During this episode patient is SOB, and becoming increasingly more restless. PRN Haldol given per orders for restlessness/agitation/psychosis. 0300-Pt appears less restless, but remains air hungry. Urine O/P adequate, 120ml since lasix. Lungs sound diminished, coarse and very shallow/tachypnic breaths. Pt states, \"Better hurry cause I don't think I'm gonna live much longer. \"     0313-Paged Dr. Misael Benson about pt's air hunger. 0338-No return page at this time. Repaged Dr. Misael Benson. 0350-Spoke to Dr. Misael Benson. Orders received for Morphine 2Mg Q4hrs for air hunger/pain. 0358-Initial dose Morphine given at this time. 0420-Assessment completed. Pt is more comfortable at this time. Respirations are even, unlabored and regular rate. Resting quietly.

## 2019-09-15 NOTE — PROGRESS NOTES
Bedside and Verbal shift change report received from WARREN Reyez RN (offgoing nurse). Report included the following information SBAR, Kardex, Intake/Output, MAR and Recent Results. 1945 Patient broke Bipap mask, was  placed on 3L NC. Sitter at bedside    2000 Shift assessment completed, see EMR. 0030 Reassessment completed, see EMR    0345 Reassessment completed, see EMR    Shift summary:     Patient interchanged between 3L to 5L NC and 6L Ventimask. Patient desaturates with movement and dyspneic at rest.      Bedside and Verbal shift change report given to ABHIJEET Nevarez RN (oncoming nurse) . Report included the following information SBAR, Kardex, Intake/Output, MAR and Recent Results.

## 2019-09-15 NOTE — PROGRESS NOTES
1512 Bedside and Verbal shift change report received from SENTHIL Phelps RN. Report included the following information SBAR, Kardex, Intake/Output, MAR and Recent Results.

## 2019-09-15 NOTE — PROGRESS NOTES
SPO2 85% ON 3.5L NC PRIOR TO SCHEDULED NEB TX.  BRIEFLY INCREASED TO 6L NC. POST TX, ABLE TO REDUCE TO 3.5L. PATIENT ADAMANTLY REFUSES BIPAP.

## 2019-09-15 NOTE — PROGRESS NOTES
conducted a Follow up consultation and Spiritual Assessment for Boom Frederick, who is a 78 y.o.,female. Patient was sleeping but woke up during prayer. Daughter at the bedside. She explained that she is meeting with Palliative Care tomorrow and will ask to move to Hospice. She is hopeful for a Hospice House in [facility]. Patient has a son also who lives about an hour away. Yesterday she had a great day surrounded by children, grandchildren, and great-grands. She participated and laughed. Family is in agreement to move to comfort care tomorrow. Continued the relationship of care and support. Listened empathically. Offered prayer and assurance of continued prayer on patients behalf. Chart reviewed. Daughter expressed gratitude for Spiritual Care visit. Chaplains will continue to follow and will provide pastoral care as needed or requested.  recommends bedside caregivers page the  on duty if patient shows signs of acute spiritual or emotional distress. [address], [place].    Board Certified   [phone] - Office

## 2019-09-15 NOTE — PROGRESS NOTES
Hospitalist Progress Note    Patient: Adina Jamison MRN: [id number]  CSN: [id number]    YOB: 1940  Age: 78 y.o. Sex: female    DOA: 9/12/2019 LOS:  LOS: 3 days              IMPRESSION and Plan:    Adina Jamison is a 78 y.o. female with   Patient Active Problem List    Diagnosis Date Noted    BiPAP (biphasic positive airway pressure) dependence 09/12/2019    Acute on chronic respiratory failure with hypoxia and hypercapnia (HCC) 09/12/2019    COPD exacerbation ([address] 09/12/2019    Hypertension 09/12/2019    Severe protein-calorie malnutrition ([address] 09/12/2019    Thrombocytopenia ([address] 09/12/2019    Erythromelalgia ([address] 09/12/2019     Active Problems:    BiPAP (biphasic positive airway pressure) dependence (9/12/2019)      Acute on chronic respiratory failure with hypoxia and hypercapnia ([address] (9/12/2019)      COPD exacerbation ([address] (9/12/2019)      Hypertension (9/12/2019)      Severe protein-calorie malnutrition ([address] (9/12/2019)      Thrombocytopenia ([address] (9/12/2019)      Erythromelalgia ([address] (9/12/2019)      Lung mass    Plan:  D/w Dr. Aliyah Vanessa  Cont BIPAP prn  Dc full dose lovenox  On abx  Lung mass w/u per pulmonary    Palliative care consult      Patient's condition is fair        Recommend to continue hospitalization. Discussed with patient. Chief Complaints:   Chief Complaint   Patient presents with    Respiratory Distress     SUBJECTIVE:  Pt is seen and examined. Chart reviwed. On BIPAP   No Fever, chills, Nausea, vomitting.            Review of systems:    Review of Systems   Unable to perform ROS: Severe respiratory distress       PE:  Patient Vitals for the past 24 hrs:   BP Temp Pulse Resp SpO2   09/15/19 1122     93 %   09/15/19 0923     98 %   09/15/19 0900 121/42  86 12 100 %   09/15/19 0837     95 %   09/15/19 0800 175/74 97.6 °F (36.4 °C) 99 26 100 %   09/15/19 0755     (!) 85 %   09/15/19 0745 140/61  92 22 96 %   09/15/19 0730 (!) 168/150  (!) 102 20 92 %   09/15/19 0715 186/72  (!) 103 (!) 31 91 %   09/15/19 0700 (!) 179/105  (!) 111 24    09/15/19 0600 135/53  79 15 93 %   09/15/19 0518 162/66  100 (!) 31 94 %   09/15/19 0501   (!) 103 27 92 %   09/15/19 0500 (!) 121/104  98 29    09/15/19 0459   97 22 91 %   09/15/19 0410   87 19 97 %   09/15/19 0400 (!) 119/101  90 17 100 %   09/15/19 0354  97.6 °F (36.4 °C)      09/15/19 0328   (!) 107 20 (!) 88 %   09/15/19 0326   (!) 107 19 (!) 84 %   09/15/19 0325   (!) 110 20 (!) 88 %   09/15/19 0300 160/67  89 20 100 %   09/15/19 0200 140/56  86 22 100 %   09/15/19 0100 101/87  96 20 100 %   09/14/19 2351  97.6 °F (36.4 °C)      09/14/19 2300 165/67  89 15 100 %   09/14/19 2200 153/56  89 16 100 %   09/14/19 2144 163/54  (!) 102 20 97 %   09/14/19 2100 160/74  99 23 94 %   09/14/19 2033     93 %   09/14/19 2013 147/61  82 13 95 %   09/14/19 2000  97.6 °F (36.4 °C)      09/14/19 1934   82 23 (!) 89 %   09/14/19 1814     98 %   09/14/19 1800 169/84  100 18 94 %   09/14/19 1600 140/56 98.2 °F (36.8 °C) 91 24 95 %   09/14/19 1524     90 %   09/14/19 1500 153/66  85 23 95 %   09/14/19 1400 135/51  92 17 97 %   09/14/19 1354     96 %   09/14/19 1300 119/64  92 13 91 %       Intake/Output Summary (Last 24 hours) at 9/15/2019 1221  Last data filed at 9/15/2019 4760  Gross per 24 hour   Intake    Output 675 ml   Net -675 ml     Patient Vitals for the past 120 hrs:   Weight   09/12/19 1407 48.1 kg (106 lb)   09/13/19 1017 48.1 kg (106 lb)         Physical Exam   Constitutional: She is oriented to person, place, and time. She appears distressed. Neck: Normal range of motion. Neck supple. No JVD present. Cardiovascular: Normal rate, regular rhythm and normal heart sounds. Pulmonary/Chest: She is in respiratory distress. She has wheezes. Abdominal: Soft. Bowel sounds are normal. She exhibits no distension. There is no tenderness. There is no rebound. Musculoskeletal: Normal range of motion. She exhibits no edema. Neurological: She is alert and oriented to person, place, and time. Skin: Skin is warm and dry. Psychiatric: Affect normal.   Nursing note and vitals reviewed. Intake and Output:  Current Shift:  No intake/output data recorded. Last three shifts:  09/13 1901 - 09/15 0700  In: -   Out: 2300 [Urine:2300]    Lab/Data Reviewed:  Recent Results (from the past 8 hour(s))   NT-PRO BNP    Collection Time: 09/15/19  4:38 AM   Result Value Ref Range    NT pro-BNP 6,744 (H) 0 - 5,742 PG/ML   METABOLIC PANEL, BASIC    Collection Time: 09/15/19  4:38 AM   Result Value Ref Range    Sodium 139 136 - 145 mmol/L    Potassium 4.3 3.5 - 5.5 mmol/L    Chloride 97 (L) 100 - 111 mmol/L    CO2 36 (H) 21 - 32 mmol/L    Anion gap 6 3.0 - 18 mmol/L    Glucose 108 (H) 74 - 99 mg/dL    BUN 34 (H) 7.0 - 18 MG/DL    Creatinine 1.21 0.6 - 1.3 MG/DL    BUN/Creatinine ratio 28 (H) 12 - 20      GFR est AA 52 (L) >60 ml/min/1.73m2    GFR est non-AA 43 (L) >60 ml/min/1.73m2    Calcium 8.4 (L) 8.5 - 10.1 MG/DL   MAGNESIUM    Collection Time: 09/15/19  4:38 AM   Result Value Ref Range    Magnesium 2.1 1.6 - 2.6 mg/dL   CBC WITH AUTOMATED DIFF    Collection Time: 09/15/19  4:38 AM   Result Value Ref Range    WBC 3.7 (L) 4.6 - 13.2 K/uL    RBC 3.84 (L) 4.20 - 5.30 M/uL    HGB 11.2 (L) 12.0 - 16.0 g/dL    HCT 36.7 35.0 - 45.0 %    MCV 95.6 74.0 - 97.0 FL    MCH 29.2 24.0 - 34.0 PG    MCHC 30.5 (L) 31.0 - 37.0 g/dL    RDW 14.2 11.6 - 14.5 %    PLATELET 020 (L) 064 - 420 K/uL    MPV 11.1 9.2 - 11.8 FL    NEUTROPHILS 88 (H) 40 - 73 %    LYMPHOCYTES 7 (L) 21 - 52 %    MONOCYTES 5 3 - 10 %    EOSINOPHILS 0 0 - 5 %    BASOPHILS 0 0 - 2 %    ABS. NEUTROPHILS 3.3 1.8 - 8.0 K/UL    ABS. LYMPHOCYTES 0.3 (L) 0.9 - 3.6 K/UL    ABS. MONOCYTES 0.2 0.05 - 1.2 K/UL    ABS. EOSINOPHILS 0.0 0.0 - 0.4 K/UL    ABS.  BASOPHILS 0.0 0.0 - 0.1 K/UL    DF AUTOMATED     GLUCOSE, POC    Collection Time: 09/15/19  5:25 AM   Result Value Ref Range    Glucose (POC) 111 (H) 70 - 110 mg/dL   URINALYSIS W/MICROSCOPIC    Collection Time: 09/15/19  6:40 AM   Result Value Ref Range    Color YELLOW      Appearance CLEAR      Specific gravity 1.029 1.005 - 1.030      pH (UA) 8.0 5.0 - 8.0      Protein 30 (A) NEG mg/dL    Glucose NEGATIVE  NEG mg/dL    Ketone NEGATIVE  NEG mg/dL    Bilirubin NEGATIVE  NEG      Blood NEGATIVE  NEG      Urobilinogen 1.0 0.2 - 1.0 EU/dL    Nitrites NEGATIVE  NEG      Leukocyte Esterase TRACE (A) NEG      WBC 1 to 4 0 - 5 /hpf    RBC 0 to 1 0 - 5 /hpf    Epithelial cells 2+ 0 - 5 /lpf    Bacteria FEW (A) NEG /hpf   GLUCOSE, POC    Collection Time: 09/15/19 11:54 AM   Result Value Ref Range    Glucose (POC) 113 (H) 70 - 110 mg/dL     Medications:  Current Facility-Administered Medications   Medication Dose Route Frequency    heparin (porcine) injection 5,000 Units  5,000 Units SubCUTAneous Q8H    ELECTROLYTE REPLACEMENT PROTOCOL - Potassium Standard Dosing   1 Each Other PRN    atenolol (TENORMIN) tablet 25 mg  25 mg Oral QPM    piperacillin-tazobactam (ZOSYN) 3.375 g in 0.9% sodium chloride (MBP/ADV) 100 mL MBP  3.375 g IntraVENous Q8H    methylPREDNISolone (PF) (SOLU-MEDROL) injection 40 mg  40 mg IntraVENous Q8H    vancomycin (VANCOCIN) 750 mg in 0.9% sodium chloride 250 mL (VIAL-MATE)  750 mg IntraVENous Q24H    Vancomycin-Rx to Dose & Monitor  1 Each Other Rx Dosing/Monitoring    albuterol-ipratropium (DUO-NEB) 2.5 MG-0.5 MG/3 ML  3 mL Nebulization QID RT    budesonide (PULMICORT) 500 mcg/2 ml nebulizer suspension  500 mcg Nebulization BID RT    doxycycline (VIBRAMYCIN) 100 mg in 0.9% sodium chloride (MBP/ADV) 100 mL MBP  100 mg IntraVENous Q12H    insulin lispro (HUMALOG) injection   SubCUTAneous Q6H    glucose chewable tablet 16 g  4 Tab Oral PRN    glucagon (GLUCAGEN) injection 1 mg  1 mg IntraMUSCular PRN    famotidine (PF) (PEPCID) 20 mg in sodium chloride 0.9% 10 mL injection  20 mg IntraVENous Q24H    nicotine (NICODERM CQ) 14 mg/24 hr patch 1 Patch  1 Patch TransDERmal Q24H    hydrALAZINE (APRESOLINE) 20 mg/mL injection 10 mg  10 mg IntraVENous Q6H PRN    haloperidol lactate (HALDOL) injection 2 mg  2 mg IntraMUSCular Q4H PRN       Recent Results (from the past 24 hour(s))   POTASSIUM    Collection Time: 09/14/19  4:00 PM   Result Value Ref Range    Potassium 3.5 3.5 - 5.5 mmol/L   GLUCOSE, POC    Collection Time: 09/14/19  5:35 PM   Result Value Ref Range    Glucose (POC) 125 (H) 70 - 110 mg/dL   GLUCOSE, POC    Collection Time: 09/14/19 11:49 PM   Result Value Ref Range    Glucose (POC) 118 (H) 70 - 110 mg/dL   NT-PRO BNP    Collection Time: 09/15/19  4:38 AM   Result Value Ref Range    NT pro-BNP 6,744 (H) 0 - 1,874 PG/ML   METABOLIC PANEL, BASIC    Collection Time: 09/15/19  4:38 AM   Result Value Ref Range    Sodium 139 136 - 145 mmol/L    Potassium 4.3 3.5 - 5.5 mmol/L    Chloride 97 (L) 100 - 111 mmol/L    CO2 36 (H) 21 - 32 mmol/L    Anion gap 6 3.0 - 18 mmol/L    Glucose 108 (H) 74 - 99 mg/dL    BUN 34 (H) 7.0 - 18 MG/DL    Creatinine 1.21 0.6 - 1.3 MG/DL    BUN/Creatinine ratio 28 (H) 12 - 20      GFR est AA 52 (L) >60 ml/min/1.73m2    GFR est non-AA 43 (L) >60 ml/min/1.73m2    Calcium 8.4 (L) 8.5 - 10.1 MG/DL   MAGNESIUM    Collection Time: 09/15/19  4:38 AM   Result Value Ref Range    Magnesium 2.1 1.6 - 2.6 mg/dL   CBC WITH AUTOMATED DIFF    Collection Time: 09/15/19  4:38 AM   Result Value Ref Range    WBC 3.7 (L) 4.6 - 13.2 K/uL    RBC 3.84 (L) 4.20 - 5.30 M/uL    HGB 11.2 (L) 12.0 - 16.0 g/dL    HCT 36.7 35.0 - 45.0 %    MCV 95.6 74.0 - 97.0 FL    MCH 29.2 24.0 - 34.0 PG    MCHC 30.5 (L) 31.0 - 37.0 g/dL    RDW 14.2 11.6 - 14.5 %    PLATELET 081 (L) 432 - 420 K/uL    MPV 11.1 9.2 - 11.8 FL    NEUTROPHILS 88 (H) 40 - 73 %    LYMPHOCYTES 7 (L) 21 - 52 %    MONOCYTES 5 3 - 10 %    EOSINOPHILS 0 0 - 5 %    BASOPHILS 0 0 - 2 %    ABS.  NEUTROPHILS 3.3 1.8 - 8.0 K/UL    ABS. LYMPHOCYTES 0.3 (L) 0.9 - 3.6 K/UL    ABS. MONOCYTES 0.2 0.05 - 1.2 K/UL    ABS. EOSINOPHILS 0.0 0.0 - 0.4 K/UL    ABS. BASOPHILS 0.0 0.0 - 0.1 K/UL    DF AUTOMATED     GLUCOSE, POC    Collection Time: 09/15/19  5:25 AM   Result Value Ref Range    Glucose (POC) 111 (H) 70 - 110 mg/dL   URINALYSIS W/MICROSCOPIC    Collection Time: 09/15/19  6:40 AM   Result Value Ref Range    Color YELLOW      Appearance CLEAR      Specific gravity 1.029 1.005 - 1.030      pH (UA) 8.0 5.0 - 8.0      Protein 30 (A) NEG mg/dL    Glucose NEGATIVE  NEG mg/dL    Ketone NEGATIVE  NEG mg/dL    Bilirubin NEGATIVE  NEG      Blood NEGATIVE  NEG      Urobilinogen 1.0 0.2 - 1.0 EU/dL    Nitrites NEGATIVE  NEG      Leukocyte Esterase TRACE (A) NEG      WBC 1 to 4 0 - 5 /hpf    RBC 0 to 1 0 - 5 /hpf    Epithelial cells 2+ 0 - 5 /lpf    Bacteria FEW (A) NEG /hpf   GLUCOSE, POC    Collection Time: 09/15/19 11:54 AM   Result Value Ref Range    Glucose (POC) 113 (H) 70 - 110 mg/dL      IMPRESSION  IMPRESSION:     No CT evidence of pulmonary thromboembolism. Severe emphysema with a large right lower lobe mass abutting the major fissure  and the diaphragm measuring up to 3.1 cm in maximal diameter. There is also a spiculated pleural-based mass in the right upper lobe measuring  1.8 cm at the base. 0.6 cm left lower lobe lung nodule present. These are suspicious for malignancy and metastatic disease. PET CT may be  helpful for further evaluation. Bilateral pleural effusions with adjacent atelectasis larger on the right. Other incidental findings as described above.     Procedures/imaging: see electronic medical records for all procedures/Xrays and details which were not copied into this note but were reviewed prior to creation of Vianey Mcbride MD   9/15/2019, 12:54 PM

## 2019-09-15 NOTE — CONSULTS
[facility]  Pulmonary, Critical Care, and Sleep Medicine    Name: Reinaldo Costa MRN: [id number]   : 1940 Hospital: [facility] [place]    Date: 9/15/2019  Room: 101/01     [facility] Note                                              Consult requesting physician: Dr. Mica Rizo  Reason for Consult: respiratory failure, pneumonia, ? Lung mass, CHF    Subjective/History of Present Illness:     Patient is a 78 y.o. female with PMHx significant for COPD recently  placed on BIPAP and oxygen ( 5L and BIPAP at [place] [facility] to rehab) CHF ( diastolic dysfunction / ) recent pneumonia/dysphagia/chornicn edema of lower extremities ( 3 years)  She stopped smoking about 3 weeks ago, heavy smoker for over 50 years, no pulmonary follow up until last hospitalization. At rehab patient was taking her bronchodilators but was refusing NIV. At rehab she was noticed to have saturation 63%   Today sob/cough no choking episodes. No chest pain. Previous PVL was negative. 9/15/2019  overnight was very agitated/confused    Broke BIPAP mask again  On low dose precedex  respiratory distress in AM back on BIPAP  Family discussion regarding goals of care today  With daughter  continue current care  BIPAP PRN   Hold on ABg due to pain agitation for now  continue bronchodilators  Dc van no MRSA   daughter who is health  care proxy made decision that tomorrow  she will discuss with palliative care options of Hospice house/ comfort care etc  We had a long discussion about  lung mass her mom would not want more procedures to r/o lung cancer           No CT evidence of pulmonary thromboembolism. Severe emphysema with a large right lower lobe mass abutting the major fissure  and the diaphragm measuring up to 3.1 cm in maximal diameter. There is also a spiculated pleural-based mass in the right upper lobe measuring  1.8 cm at the base. 0.6 cm left lower lobe lung nodule present.   These are suspicious for malignancy and metastatic disease. PET CT may be  helpful for further evaluation. Bilateral pleural effusions with adjacent atelectasis larger on the right. Other incidental findings as described above. 9/14/2019  Dc full dose lovenox no pe  Severe emphysema and lung mass     yesterday confirmed that patient  signed DNR/DNI in august a copy in a chart  continue bIPAP all night and daytime prn  Last night very agiated poro response to haldol  I will order precedex if agiated please start lwo dose as toelrated  bicabonate betetr aftre diamox give 1 more dose today     For agiation precedex as needed         9/13/2019  Overnight was very agitated but after  Haldol was finally able to tolerate BIPAP  Today was off BIPAP for short period but sob back on BIPAP  ABg close to baseline  Now contraction alkalosis so add 1 dose diamox hold lasix for 24 hrs   Very poor  prognosis  Today consulted palliative to clarify code status ( reports from [facility]  indicate was DNR/DNI ? )  We will confirm with patient and family  I asked patient today but she was too confused to answer  panculure  CTa today evaluated  for both PE and mass pneumonia  Chronic  thrombocytopenia of unclear etiology  We have checked the records at [place] and was low <100   Monitor  Echo  Diastolic CHF   Trop   ecg   Lactic acid normla     On lovenox full dose if no pe change to SQ heparin     9/12/2019  BIPAP  abg follow up in 2-3 hrs   bronchodilators   Steroids abx  Gentle diuresis mixed pattern might need diamox tomorrow  if bicarbonate worse  Echo  Trop  pvl  CTA when more stable r/o lung mass  I had discussion with patient  and       I/O last 24 hrs:      Intake/Output Summary (Last 24 hours) at 9/15/2019 1132  Last data filed at 9/15/2019 3611  Gross per 24 hour   Intake    Output 675 ml   Net -675 ml         The patient is critically ill and can not provide additional history due to  Respiratory failure     [facility] and partially from [facility]     Review of Systems:  A comprehensive review of systems was negative except for that written in the HPI. Review of Systems:   HEENT: No epistaxis, no nasal drainage, no difficulty in swallowing, no redness in eyes  Respiratory: as above  Cardiovascular: no chest pain, no palpitations, no chronic leg edema, no syncope  Gastrointestinal: no abd pain, no vomiting, no diarrhea, no bleeding symptoms  Genitourinary: No urinary symptoms or hematuria  Musculoskeletal:Neg  Neurological: No focal weakness, no seizures, no headaches  Behvioral/Psych: No anxiety, no depression  Constitutional: No fever, no chills, no weight loss, no night sweats     Allergies   Allergen Reactions    Iodine Hives      Past Medical History:   Diagnosis Date    Chronic kidney disease (CKD)     COPD (chronic obstructive pulmonary disease) (HCC)     Erythromelalgia (HCC)     Gait abnormality     Hypertension     Hypoxia     Muscle weakness (generalized)     Nicotine dependence     Osteoporosis     Respiratory failure (HCC)     Vitamin D deficiency       History reviewed. No pertinent surgical history. Social History     Tobacco Use    Smoking status: Former Smoker    Smokeless tobacco: Never Used   Substance Use Topics    Alcohol use: Not on file      History reviewed. No pertinent family history. Prior to Admission medications    Medication Sig Start Date End Date Taking? Authorizing Provider   fluticasone furoate-vilanterol (BREO ELLIPTA) 100-25 mcg/dose inhaler Take 1 Puff by inhalation daily. Yes Other, MD Pepe   nicotine (NICODERM CQ) 14 mg/24 hr patch 1 Patch by TransDERmal route every twenty-four (24) hours. Yes Other, MD Pepe   predniSONE (DELTASONE) 20 mg tablet Take 20 mg by mouth Every Friday. Yes Randy, MD Pepe   furosemide (LASIX) 20 mg tablet Take 20 mg by mouth daily. Yes Randy, MD Pepe   atenolol (TENORMIN) 50 mg tablet Take  by mouth daily.    Yes Pepe Padilla MD atenolol (TENORMIN) 25 mg tablet Take 25 mg by mouth every evening. Yes Other, MD Pepe   lisinopril-hydroCHLOROthiazide (PRINZIDE, ZESTORETIC) 20-12.5 mg per tablet Take 1 Tab by mouth daily. Yes Randy, MD Pepe   tiotropium (SPIRIVA WITH HANDIHALER) 18 mcg inhalation capsule Take 1 Cap by inhalation daily. Yes Randy, MD Pepe   raNITIdine (ZANTAC) 150 mg tablet Take 150 mg by mouth daily. Yes Randy, MD Pepe   bumetanide (BUMEX) 2 mg tablet Take 2 mg by mouth two (2) times a day.    Yes Randy, MD Pepe     Current Facility-Administered Medications   Medication Dose Route Frequency    heparin (porcine) injection 5,000 Units  5,000 Units SubCUTAneous Q8H    atenolol (TENORMIN) tablet 25 mg  25 mg Oral QPM    piperacillin-tazobactam (ZOSYN) 3.375 g in 0.9% sodium chloride (MBP/ADV) 100 mL MBP  3.375 g IntraVENous Q8H    methylPREDNISolone (PF) (SOLU-MEDROL) injection 40 mg  40 mg IntraVENous Q8H    vancomycin (VANCOCIN) 750 mg in 0.9% sodium chloride 250 mL (VIAL-MATE)  750 mg IntraVENous Q24H    Vancomycin-Rx to Dose & Monitor  1 Each Other Rx Dosing/Monitoring    albuterol-ipratropium (DUO-NEB) 2.5 MG-0.5 MG/3 ML  3 mL Nebulization QID RT    budesonide (PULMICORT) 500 mcg/2 ml nebulizer suspension  500 mcg Nebulization BID RT    doxycycline (VIBRAMYCIN) 100 mg in 0.9% sodium chloride (MBP/ADV) 100 mL MBP  100 mg IntraVENous Q12H    insulin lispro (HUMALOG) injection   SubCUTAneous Q6H    famotidine (PF) (PEPCID) 20 mg in sodium chloride 0.9% 10 mL injection  20 mg IntraVENous Q24H    nicotine (NICODERM CQ) 14 mg/24 hr patch 1 Patch  1 Patch TransDERmal Q24H         Objective:   Vital Signs:    Visit Vitals  /42   Pulse 86   Temp 97.6 °F (36.4 °C)   Resp 12   Ht 4' 11\" (1.499 m)   Wt 48.1 kg (106 lb)   SpO2 93%   BMI 21.41 kg/m²       O2 Device: BIPAP   O2 Flow Rate (L/min): 3.5 l/min   Temp (24hrs), Av.7 °F (36.5 °C), Min:97.6 °F (36.4 °C), Max:98.2 °F (36.8 °C)       Intake/Output: Last shift:      No intake/output data recorded. Last 3 shifts: 09/13 1901 - 09/15 0700  In: -   Out: 2300 [Urine:2300]      Intake/Output Summary (Last 24 hours) at 9/15/2019 1132  Last data filed at 9/15/2019 0512  Gross per 24 hour   Intake    Output 675 ml   Net -675 ml       Last 3 Recorded Weights in this Encounter    09/12/19 1407 09/13/19 1017   Weight: 48.1 kg (106 lb) 48.1 kg (106 lb)       BIPAP 16/7 fio2 35 %    Recent Labs     09/14/19  1010 09/13/19  0631 09/12/19 2050   PHI 7.459* 7.461* 7.394   PCO2I 60.0* 71.5* 76.7*   PO2I 74* 72* 88   HCO3I 42.6* 51.1* 47.3*   FIO2I 35 0.35 0.35       Physical Exam:     General/Neurology: Alert, Awake in moderate respiratory distress    Head:   Normocephalic, without obvious abnormality, atraumatic. Eye:   EOM intact  no scleral icterus, no pallor, no cyanosis. Nose:   No sinus tenderness  Throat:  Lips, mucosa, and tongue normal. No oral thrush. Neck:   Supple, symmetric. No lymphadenopathy. Trachea midline  Lung:   Bilateral crckles mild rhonchi   Heart:   Regular rate & rhythm. S1 S2 present. No murmur. No JVD. Abdomen:  Soft. NT. ND. +BS. No masses. Extremities:  Bilateral 3 plus edema . No cyanosis. No clubbing. Pulses: weak present   Lymphatic:  No cervical or supraclavicular palpable lymphadenopathy. Musculoskeletal: No joint swelling. No tenderness.    Skin:    red tender lwoer extremites       Data:       Recent Results (from the past 24 hour(s))   POTASSIUM    Collection Time: 09/14/19  4:00 PM   Result Value Ref Range    Potassium 3.5 3.5 - 5.5 mmol/L   GLUCOSE, POC    Collection Time: 09/14/19  5:35 PM   Result Value Ref Range    Glucose (POC) 125 (H) 70 - 110 mg/dL   GLUCOSE, POC    Collection Time: 09/14/19 11:49 PM   Result Value Ref Range    Glucose (POC) 118 (H) 70 - 110 mg/dL   NT-PRO BNP    Collection Time: 09/15/19  4:38 AM   Result Value Ref Range    NT pro-BNP 6,744 (H) 0 - 8,460 PG/ML   METABOLIC PANEL, BASIC    Collection Time: 09/15/19  4:38 AM   Result Value Ref Range    Sodium 139 136 - 145 mmol/L    Potassium 4.3 3.5 - 5.5 mmol/L    Chloride 97 (L) 100 - 111 mmol/L    CO2 36 (H) 21 - 32 mmol/L    Anion gap 6 3.0 - 18 mmol/L    Glucose 108 (H) 74 - 99 mg/dL    BUN 34 (H) 7.0 - 18 MG/DL    Creatinine 1.21 0.6 - 1.3 MG/DL    BUN/Creatinine ratio 28 (H) 12 - 20      GFR est AA 52 (L) >60 ml/min/1.73m2    GFR est non-AA 43 (L) >60 ml/min/1.73m2    Calcium 8.4 (L) 8.5 - 10.1 MG/DL   MAGNESIUM    Collection Time: 09/15/19  4:38 AM   Result Value Ref Range    Magnesium 2.1 1.6 - 2.6 mg/dL   CBC WITH AUTOMATED DIFF    Collection Time: 09/15/19  4:38 AM   Result Value Ref Range    WBC 3.7 (L) 4.6 - 13.2 K/uL    RBC 3.84 (L) 4.20 - 5.30 M/uL    HGB 11.2 (L) 12.0 - 16.0 g/dL    HCT 36.7 35.0 - 45.0 %    MCV 95.6 74.0 - 97.0 FL    MCH 29.2 24.0 - 34.0 PG    MCHC 30.5 (L) 31.0 - 37.0 g/dL    RDW 14.2 11.6 - 14.5 %    PLATELET 010 (L) 262 - 420 K/uL    MPV 11.1 9.2 - 11.8 FL    NEUTROPHILS 88 (H) 40 - 73 %    LYMPHOCYTES 7 (L) 21 - 52 %    MONOCYTES 5 3 - 10 %    EOSINOPHILS 0 0 - 5 %    BASOPHILS 0 0 - 2 %    ABS. NEUTROPHILS 3.3 1.8 - 8.0 K/UL    ABS. LYMPHOCYTES 0.3 (L) 0.9 - 3.6 K/UL    ABS. MONOCYTES 0.2 0.05 - 1.2 K/UL    ABS. EOSINOPHILS 0.0 0.0 - 0.4 K/UL    ABS.  BASOPHILS 0.0 0.0 - 0.1 K/UL    DF AUTOMATED     GLUCOSE, POC    Collection Time: 09/15/19  5:25 AM   Result Value Ref Range    Glucose (POC) 111 (H) 70 - 110 mg/dL   URINALYSIS W/MICROSCOPIC    Collection Time: 09/15/19  6:40 AM   Result Value Ref Range    Color YELLOW      Appearance CLEAR      Specific gravity 1.029 1.005 - 1.030      pH (UA) 8.0 5.0 - 8.0      Protein 30 (A) NEG mg/dL    Glucose NEGATIVE  NEG mg/dL    Ketone NEGATIVE  NEG mg/dL    Bilirubin NEGATIVE  NEG      Blood NEGATIVE  NEG      Urobilinogen 1.0 0.2 - 1.0 EU/dL    Nitrites NEGATIVE  NEG      Leukocyte Esterase TRACE (A) NEG      WBC 1 to 4 0 - 5 /hpf    RBC 0 to 1 0 - 5 /hpf    Epithelial cells 2+ 0 - 5 /lpf    Bacteria FEW (A) NEG /hpf         Chemistry Recent Labs     09/15/19  0438 09/14/19  1600 09/14/19  0408 09/13/19  0430 09/12/19  1410   *  --  109* 154* 95     --  141 140 140   K 4.3 3.5 3.0* 3.9 3.9   CL 97*  --  92* 87* 89*   CO2 36*  --  43* >45* >45*   BUN 34*  --  25* 22* 20*   CREA 1.21  --  1.06 1.09 0.88   CA 8.4*  --  8.3* 8.3* 8.8   MG 2.1  --  2.1 2.1  --    AGAP 6  --  6 Cannot be calculated Cannot be calculated   BUCR 28*  --  24* 20 23*   AP  --   --  64  --  108   TP  --   --  4.8*  --  6.2*   ALB  --   --  2.7*  --  3.6   GLOB  --   --  2.1  --  2.6   AGRAT  --   --  1.3  --  1.4        Lactic Acid Lactic acid   Date Value Ref Range Status   09/14/2019 0.8 0.4 - 2.0 MMOL/L Final     Recent Labs     09/14/19  0408 09/13/19  1143   LAC 0.8 1.6        Liver Enzymes Protein, total   Date Value Ref Range Status   09/14/2019 4.8 (L) 6.4 - 8.2 g/dL Final     Albumin   Date Value Ref Range Status   09/14/2019 2.7 (L) 3.4 - 5.0 g/dL Final     Globulin   Date Value Ref Range Status   09/14/2019 2.1 2.0 - 4.0 g/dL Final     A-G Ratio   Date Value Ref Range Status   09/14/2019 1.3 0.8 - 1.7   Final     AST (SGOT)   Date Value Ref Range Status   09/14/2019 22 10 - 38 U/L Final     Alk.  phosphatase   Date Value Ref Range Status   09/14/2019 64 45 - 117 U/L Final     Recent Labs     09/14/19  0408 09/12/19  1410   TP 4.8* 6.2*   ALB 2.7* 3.6   GLOB 2.1 2.6   AGRAT 1.3 1.4   SGOT 22 21   AP 64 108        CBC w/Diff Recent Labs     09/15/19  0438 09/14/19  0408 09/13/19  0430   WBC 3.7* 2.7* 2.7*   RBC 3.84* 3.40* 3.89*   HGB 11.2* 10.1* 11.3*   HCT 36.7 31.9* 37.4   * 78* 82*   GRANS 88* 83* 83*   LYMPH 7* 13* 15*   EOS 0 0 0        Cardiac Enzymes No results found for: CPK, CK, CKMMB, CKMB, RCK3, CKMBT, CKNDX, CKND1, ENEIDA, TROPT, TROIQ, BIB, TROPT, TNIPOC, BNP, BNPP     BNP No results found for: BNP, BNPP, XBNPT     Coagulation Recent Labs     09/14/19 0408   PTP 13.5   INR 1.1 Thyroid  No results found for: T4, T3U, TSH, TSHEXT, TSHEXT    No results found for: T4     Urinalysis Lab Results   Component Value Date/Time    Color YELLOW 09/15/2019 06:40 AM    Appearance CLEAR 09/15/2019 06:40 AM    Specific gravity 1.029 09/15/2019 06:40 AM    pH (UA) 8.0 09/15/2019 06:40 AM    Protein 30 (A) 09/15/2019 06:40 AM    Glucose NEGATIVE  09/15/2019 06:40 AM    Ketone NEGATIVE  09/15/2019 06:40 AM    Bilirubin NEGATIVE  09/15/2019 06:40 AM    Urobilinogen 1.0 09/15/2019 06:40 AM    Nitrites NEGATIVE  09/15/2019 06:40 AM    Leukocyte Esterase TRACE (A) 09/15/2019 06:40 AM    Epithelial cells 2+ 09/15/2019 06:40 AM    Bacteria FEW (A) 09/15/2019 06:40 AM    WBC 1 to 4 09/15/2019 06:40 AM    RBC 0 to 1 09/15/2019 06:40 AM        Micro  Recent Labs     09/13/19  1143   CULT NO GROWTH 2 DAYS  NO GROWTH 2 DAYS     Recent Labs     09/13/19  1143   CULT NO GROWTH 2 DAYS  NO GROWTH 2 DAYS          Culture data during this hospitalization.    All Micro Results     Procedure Component Value Units Date/Time    CULTURE, URINE [[id number]] Collected:  09/15/19 0640    Order Status:  Completed Specimen:  Cath Urine Updated:  09/15/19 0713    CULTURE, BLOOD [[id number]] Collected:  09/13/19 1143    Order Status:  Completed Specimen:  Whole Blood Updated:  09/15/19 0035     Special Requests: NO SPECIAL REQUESTS        Culture result: NO GROWTH 2 DAYS       CULTURE, BLOOD [[id number]] Collected:  09/13/19 1143    Order Status:  Completed Specimen:  Whole Blood Updated:  09/15/19 0035     Special Requests: NO SPECIAL REQUESTS        Culture result: NO GROWTH 2 DAYS       LEGIONELLA PNEUMOPHILA AG, URINE [[id number]] Collected:  09/12/19 1630    Order Status:  Canceled Specimen:  Urine     STREP Geraldine Older, URINE [[id number]] Collected:  09/12/19 1630    Order Status:  Canceled Specimen:  Urine                PFT       Ultrasound       LE Doppler       ECHO       Images report reviewed by me:  CT (Most Recent) (CT chest reviewed by me) Results from Hospital Encounter encounter on 09/12/19   CTA CHEST W OR W WO CONT    Narrative CTA CHEST    Indication: Shortness of breath, respiratory failure, COPD    Comparison: None. Technique: Axial imaging was obtained dynamically through the pulmonary arteries  using high-resolution CT angiographic protocol, without complications. In  addition, coronal and sagittal reconstructed MIP arteriograms were performed, to  better evaluate the pulmonary vasculature, and to increase sensitivity for  detection of pulmonary emboli. One or more dose reduction techniques were used on this CT: automated exposure  control, adjustment of the mAs and/or kVp according to patient's size, and  iterative reconstruction techniques. The specific techniques utilized on this CT  exam have been documented in the patient's electronic medical record. Digital Imaging and Communications in Medicine (DICOM) format image data are  available to nonaffiliated external healthcare facilities or entities on a  secure, media free, reciprocally searchable basis with patient authorization for  at least a 12-month period after this study. CT angiogram quality parameters:  1. Overall exam quality - satisfactory: adequate   2. Adequacy of pulmonary enhancement-adequate: Sufficient enhancement. Main PA  density 190-250 HU   3. Adequacy of breath-hold-optimal: No respiratory artifact    4. There are no  significant noise, beam hardening, streak  artifacts affecting  scan quality.    ============    PULMONARY ARTERIES: The pulmonary arteries are opacified completely, without  evidence of filling defects to suggest pulmonary thromboembolism. AORTA:  Extensive atherosclerotic calcifications are seen with mild tortuosity;  no aneurysm or dissection, allowing for optimal contrast opacification directed  to the pulmonary arteries and in the setting of moderate cardiac motion  artifact.       LUNGS: Severe emphysematous changes are seen throughout both lungs. There is a large right lower lobe mass measuring 3.1 cm x 2.6 cm with adjacent  atelectasis extending to the right heart border and the lateral chest wall. Pleural-based spiculated mass is seen in the right upper lobe measuring 1.8 cm  in length at the base by 1.2 cm in maximal thickness. Left lower lobe lung nodule noted measuring 0.6 cm (image 72) with surrounding  patchy infiltrate. PLEURA: Small bilateral pleural effusions are seen larger on the lower right  layering posteriorly with adjacent atelectasis. AIRWAY: Mild bronchial wall thickening noted. MEDIASTINUM: Normal heart size with coronary arterial calcifications. No  pericardial effusion. Thoracic aorta is tortuous and partially calcified without aneurysmal dilatation  or dissection. LYMPH NODES: No mediastinal, hilar or axillary lymphadenopathy. UPPER ABDOMEN: No acute abnormality. OTHER: Degenerative changes are seen throughout the spine.    ============      Impression IMPRESSION:    No CT evidence of pulmonary thromboembolism. Severe emphysema with a large right lower lobe mass abutting the major fissure  and the diaphragm measuring up to 3.1 cm in maximal diameter. There is also a spiculated pleural-based mass in the right upper lobe measuring  1.8 cm at the base. 0.6 cm left lower lobe lung nodule present. These are suspicious for malignancy and metastatic disease. PET CT may be  helpful for further evaluation. Bilateral pleural effusions with adjacent atelectasis larger on the right. Other incidental findings as described above. CXR reviewed by me:  XR (Most Recent). CXR  reviewed by me and compared with previous CXR Results from Hospital Encounter encounter on 09/12/19   XR CHEST PORT    Narrative EXAM: CHEST RADIOGRAPH, SINGLE VIEW    CLINICAL INDICATION/HISTORY: Shortness of breath    COMPARISON: None. TECHNIQUE: Portable frontal view of the chest was obtained. _______________    FINDINGS:    SUPPORT DEVICES: None. HEART AND MEDIASTINUM: Cardiomediastinal silhouette appears within normal  limits. Normal caliber thoracic aorta. No central vascular congestion. Asymmetric right hilar convexity    LUNGS AND PLEURAL SPACES: Small, right greater than left pleural effusions, each  with adjacent basilar atelectasis. Asymmetric, right greater than left apical  capping is also present. Rounded opacity is seen within the inferior aspect of  the right lower lobe. There is also asymmetric pleural-based wedge-shaped  opacity within the lateral aspect of the right upper lobe. BONY THORAX AND SOFT TISSUES: No acute osseous abnormality. Moderate  degenerative changes of right and left shoulders with left-sided rotator cuff  arthropathy. _______________      Impression IMPRESSION:    Small, right greater than left pleural effusions with masslike opacity within  inferior right lower lobe and lateral right upper lobe. Asymmetric right hilar  convexity, concerning for lymphadenopathy. Recommend chest CT to evaluate  cluster of findings which are concerning for malignancy. IMPRESSION:   · Acute on chronic  hypercarbic and hypoxemic respiratory failure- COPd exacerbation. I have reviewed data and she had been hospitalized . Non complaint with NIV on 5L at [facility] . Resume NIV . Copd tx   · Acute on chronic  CHF- ? Diastolic dysfunction - not clear worse now patient was on diuretics and had normal EF. Low albumins , chronic  edema. Gentle diuresis if bicarbonate worse might need Diamox. echo trop ecg     abnormal Ct of the chest- lung mass with possible mets. Very large  . accessible for biopsy but high risk severe emphysema end stage copd  Family does not want to do   · Pneumonia ? HAP recently NH/hospital or aspiration van zosyn doxy .  Urine strep leg  · Possible lung mass once more stable CTa for both Pe and lung mass   · Hypoxemia- multifactorial COPD/CHF diastolic/ pneumonia vs lung mass. · agiation precedex as needed   Patient Active Problem List   Diagnosis Code    BiPAP (biphasic positive airway pressure) dependence Z99.89    Acute on chronic respiratory failure with hypoxia and hypercapnia ([facility]) J96.21, J96.22    COPD exacerbation ([facility]) J44.1    Hypertension I10    Severe protein-calorie malnutrition (HCC) E43    Thrombocytopenia ([facility]) D69.6    Erythromelalgia ([facility] [address] I73.81           · Code status: I had a discussion about code status  If DNR/DNI tomoorw possible hospice       RECOMMENDATIONS:   Respiratory:  BIPAP 16/7 fio2 30   bronchodilators  abg   Gentle dieresis  abx   lovenox prophylaxis   No PE  Lung mass possible cnacer   Keep SPO2 >=92%. HOB 30 degree elevation all the time. Aggressive pulmonary toileting. Aspiration precautions. Incentive spirometry. CVS: ? diastolic CHf ? BNP high r/o PE as well less  likely . Had normal echo 1 month ago. Trop ecg   ID: pneumonia aspiration vs HAP  Van zosyn doxy   Sepsis bundle and protocol followed. Follow serial lactic acid, frequent BMP check, fluid resuscitation. Follow cultures. Deescalate antibiotic when appropriate. Hematology/Oncology: follow wbc   Renal: daily high bicabonate if higher hold lasix and give diamox   GI/: ppi   Endocrine: Monitor BS. SSI. NPain/Sedation: prn   Skin/Wound: legs swollen tender erythema   Electrolytes: Replace electrolytes per ICU electrolyte replacement protocol. IVF: kvo  Nutrition: hold today   Prophylaxis: DVT Prophylaxis:  Full dose lovenox  GI Prophylaxis:   patel  Advance Directive/Palliative Care: consulted    Will defer respective systems problem management to primary and other respective consultant and follow patient in ICU with primary and other medical team.  Further recommendations will be based on the patient's response to recommended treatment and results of the investigation ordered.   Quality Care: PPI, DVT prophylaxis, HOB elevated, Infection control all reviewed and addressed. Care of plan d/w RN, RT, MDR.  D/w patient and family     Moderate complexity decision making was performed during the evaluation of this patient at high risk for decompensation with multiple organ involvement. Total critical care time spent rendering care exclusive of procedures: 90  minutes.          Sherrie Patel MD  9/15/2019

## 2019-09-15 NOTE — PROGRESS NOTES
SLP NOTE -    4701: SLP eval orders received and chart review completed. Pt seen for swallow eval. Recommend regular texture diet with thin liquids. Diet order placed; full note to follow.     Thank you for this referral.    Nataliia Ricketts M.S., [id number] [facility]  Speech-Language Pathologist

## 2019-09-15 NOTE — PROGRESS NOTES
Pharmacy Dosing Services: Vancomycin   DOT: 4 of 7  Indication: HCAP  SCr increased to 1.21 today   CrCl ~ 27.7 ml/min   WBC = 3.7  Afebrile  Vancomycin trough = 18.4 ==> Therapeutic (goal 15-20)  Continue current dose of Vancomycin 750 mg IV q24h ==> watch renal function closely  Pharmacy to continue to follow and adjust dose as necessary  Lois Michel [address]

## 2019-09-15 NOTE — PROGRESS NOTES
Problem: Dysphagia (Adult)  Goal: *Acute Goals and Plan of Care (Insert Text)  Description  Patient will:  1. Tolerate regular diet with thin liquids without overt s/sx of aspiration in 4/5 trials under SLP supervision. 2. Utilize compensatory swallow strategies of small bite/sip, alternate liquid/solid with min cues in 4/5 trials. Rec:   regular diet, thin liquids  Aspiration precautions  HOB >45 during po intake, remain >30 for 30-45 minutes after po   Small bites/sips; alternate liquid/solid, slow feeding rate   Oral care TID  Meds per pt preference     Outcome: Progressing Towards Goal     SPEECH LANGUAGE PATHOLOGY BEDSIDE SWALLOW EVALUATION    Patient: Yang Grandchild (1 y.o. female)  Date: 9/15/2019  Primary Diagnosis: Acute respiratory failure with hypoxia and hypercapnia (HCC) [J96.01, J96.02]  BiPAP (biphasic positive airway pressure) dependence [Z99.89]        Precautions: aspiration       PLOF: regular/thin    ASSESSMENT :  Based on the objective data described below, the patient presents with oropharyngeal swallow that is [facility]. Pt seen for swallow eval. Pt Alert and oriented to self, accepting of eval. Pt and family at bedside deny difficulty swallowing, reporting regular diet prior to current admission. Oral motor structures, strength, and ROM WFL; grossly intact for mastication and deglutition. Functional communication. Intelligibility >90%. Pt self-feeding PO trials of thin liquid via straw and tandem drinking, puree, mixed, and regular textures. No s/sx of aspiration appreciated across consistencies. Oropharyngeal swallow appears WFL. Swallow appears timely, adequate laryngeal elevation noted via palpation, no change in vocal/resp quality appreciated. Recommend regular texture diet with thin liquids, meds per pt preference. Pt may benefit from assistance with tray setup. Pt and family at bedside educated on and verbalized understanding of findings, recs, and POC; d/w RN.   SLP will FU x1-2 visits for diet tolerance. Patient will benefit from skilled intervention to address the above impairments. Patient's rehabilitation potential is considered to be Excellent  Factors which may influence rehabilitation potential include:   ? None noted     PLAN :  Recommendations and Planned Interventions:  See above  Frequency/Duration: Patient will be followed by speech-language pathology 1-2 times to address goals. Discharge Recommendations: To Be Determined     SUBJECTIVE:   Patient stated This is good. OBJECTIVE:     Past Medical History:   Diagnosis Date    Chronic kidney disease (CKD)     COPD (chronic obstructive pulmonary disease) (HCC)     Erythromelalgia (HCC)     Gait abnormality     Hypertension     Hypoxia     Muscle weakness (generalized)     Nicotine dependence     Osteoporosis     Respiratory failure (HCC)     Vitamin D deficiency    History reviewed. No pertinent surgical history. Home Situation:   Home Situation  Home Environment: Private residence  # Steps to Enter: 3  One/Two Story Residence: One story  Living Alone: No  Support Systems: Family member(s), Home care staff  Patient Expects to be Discharged to[de-identified] Private residence(or gardens )  Current DME Used/Available at Home: Nebulizer, Walker    Diet prior to admission: regular  Current Diet:  regular/thin     Cognitive and Communication Status:  Neurologic State: Alert, Appropriate for age  Orientation Level: Oriented to person  Cognition: Follows commands  Perception: Appears intact  Perseveration: No perseveration noted  Safety/Judgement: Fall prevention  Oral Assessment:  Oral Assessment  Labial: No impairment  Dentition: Natural;Intact  Oral Hygiene: Adequate  Lingual: No impairment  Velum: No impairment  Mandible: No impairment  P.O. Trials:  Patient Position: 76* HOB  Vocal quality prior to P.O.: No impairment  Consistency Presented: Thin liquid;Puree; Solid;Mixed consistency  How Presented: Self-fed/presented;Straw;Successive swallows;Spoon     Bolus Acceptance: No impairment  Bolus Formation/Control: No impairment     Propulsion: No impairment  Oral Residue: None  Initiation of Swallow: No impairment  Laryngeal Elevation: Functional  Aspiration Signs/Symptoms: None  Pharyngeal Phase Characteristics: No impairment, issues, or problems   Effective Modifications: Alternate liquids/solids;Small sips and bites  Cues for Modifications: None       Oral Phase Severity: No impairment  Pharyngeal Phase Severity : No impairment    PAIN:  Pain level pre-treatment: 0/10   Pain level post-treatment: 0/10   Pain Intervention(s): NA   Response to intervention: NA    After treatment:   ?            Patient left in no apparent distress sitting up in chair  ? Patient left in no apparent distress in bed  ? Call bell left within reach  ? Nursing notified  ? Family present  ? Caregiver present  ? Bed alarm activated    COMMUNICATION/EDUCATION:   ?            Aspiration precautions; swallow safety; compensatory techniques. ?            Patient/family have participated as able in goal setting and plan of care. ?            Patient/family agree to work toward stated goals and plan of care. ?            Patient understands intent and goals of therapy; neutral about participation. ? Patient unable to participate in goal setting/plan of care; educ ongoing with interdisciplinary staff  ? Posted safety precautions in patient's room.     Thank you for this referral.    Reny Michael M.S., CCC-SLP  Speech-Language Pathologist    Time Calculation: 8 mins

## 2019-09-16 PROBLEM — R91.8 LUNG MASS: Status: ACTIVE | Noted: 2019-01-01

## 2019-09-16 PROBLEM — J43.9 EMPHYSEMA LUNG (HCC): Status: ACTIVE | Noted: 2019-01-01

## 2019-09-16 PROBLEM — I50.31 ACUTE DIASTOLIC CONGESTIVE HEART FAILURE (HCC): Status: ACTIVE | Noted: 2019-01-01

## 2019-09-16 NOTE — PROGRESS NOTES
[facility]  Pulmonary, Critical Care, and Sleep Medicine    Name: Mckay Ray MRN: [id number]   : 1940 Hospital: [facility] [place]    Date: 2019  Room: 101/01     [facility] Note                                              Consult requesting physician: Dr. Eliza Murdock  Reason for Consult: respiratory failure, pneumonia, ? Lung mass, CHF    Subjective/History of Present Illness:   Patient is a 78 y.o. female with PMHx significant for COPD recently  placed on BIPAP and oxygen ( 5L and BIPAP at [facility] to rehab) CHF ( diastolic dysfunction / ) recent pneumonia/dysphagia/chornicn edema of lower extremities ( 3 years). She stopped smoking about 3 weeks ago, heavy smoker for over 50 years, no pulmonary follow up until last hospitalization. At rehab patient was taking her bronchodilators but was refusing NIV. At rehab she was noticed to have saturation 63%   Today sob/cough no choking episodes. No chest pain. Previous PVL was negative.       19  overnight was calm per staff, responding to morphine  Off of Precedex  Used BIPAP and tolerated well  Off of BiPAP now and on O2 via NC at 3 Lpm  No fever. Tolerating assisted feeds at bedside  Awake and answering slowly but appropriately  No cough, wheezing, CP or hemoptysis  Denies any palpitations, syncope, orthopnea or pnd     Daughter who is health care proxy will be discussing with palliative care options of Hospice house/ comfort care      No CT evidence of pulmonary thromboembolism. Severe emphysema with a large right lower lobe mass abutting the major fissure  and the diaphragm measuring up to 3.1 cm in maximal diameter. There is also a spiculated pleural-based mass in the right upper lobe measuring  1.8 cm at the base. 0.6 cm left lower lobe lung nodule present. These are suspicious for malignancy and metastatic disease. PET CT may be  helpful for further evaluation.   Bilateral pleural effusions with adjacent atelectasis larger on the right. Other incidental findings as described above. I/O last 24 hrs: Intake/Output Summary (Last 24 hours) at 9/16/2019 0959  Last data filed at 9/16/2019 0546  Gross per 24 hour   Intake    Output 1125 ml   Net -1125 ml       Review of Systems:  The patient is critically ill and can not provide additional history due to Respiratory failure       Allergies   Allergen Reactions    Iodine Hives      Past Medical History:   Diagnosis Date    Chronic kidney disease (CKD)     COPD [facility]chronic obstructive pulmonary disease) ([facility] [address]     Erythromelalgia (HCC)     Gait abnormality     Hypertension     Hypoxia     Muscle weakness (generalized)     Nicotine dependence     Osteoporosis     Respiratory failure (HCC)     Vitamin D deficiency       History reviewed. No pertinent surgical history. Social History     Tobacco Use    Smoking status: Former Smoker    Smokeless tobacco: Never Used   Substance Use Topics    Alcohol use: Not on file      History reviewed. No pertinent family history. Prior to Admission medications    Medication Sig Start Date End Date Taking? Authorizing Provider   fluticasone furoate-vilanterol (BREO ELLIPTA) 100-25 mcg/dose inhaler Take 1 Puff by inhalation daily. Yes Randy, MD Pepe   nicotine (NICODERM CQ) 14 mg/24 hr patch 1 Patch by TransDERmal route every twenty-four (24) hours. Yes Randy, MD Pepe   predniSONE (DELTASONE) 20 mg tablet Take 20 mg by mouth Every Friday. Yes Pepe Padilla MD   furosemide (LASIX) 20 mg tablet Take 20 mg by mouth daily. Yes Randy, MD Pepe   atenolol (TENORMIN) 50 mg tablet Take  by mouth daily. Yes Pepe Padilla MD   atenolol (TENORMIN) 25 mg tablet Take 25 mg by mouth every evening. Yes Randy, MD Pepe   lisinopril-hydroCHLOROthiazide (PRINZIDE, ZESTORETIC) 20-12.5 mg per tablet Take 1 Tab by mouth daily.    Yes Randy, MD Pepe   tiotropium (SPIRIVA WITH HANDIHALER) 18 mcg inhalation capsule Take 1 Cap by inhalation daily. Yes Randy, MD Pepe   raNITIdine (ZANTAC) 150 mg tablet Take 150 mg by mouth daily. Yes Randy, MD Pepe   bumetanide (BUMEX) 2 mg tablet Take 2 mg by mouth two (2) times a day. Yes Randy, MD Pepe     Current Facility-Administered Medications   Medication Dose Route Frequency    potassium chloride 10 mEq in 100 ml IVPB  10 mEq IntraVENous Q1H    insulin lispro (HUMALOG) injection   SubCUTAneous AC&HS    heparin (porcine) injection 5,000 Units  5,000 Units SubCUTAneous Q8H    atenolol (TENORMIN) tablet 25 mg  25 mg Oral QPM    piperacillin-tazobactam (ZOSYN) 3.375 g in 0.9% sodium chloride (MBP/ADV) 100 mL MBP  3.375 g IntraVENous Q8H    methylPREDNISolone (PF) (SOLU-MEDROL) injection 40 mg  40 mg IntraVENous Q8H    vancomycin (VANCOCIN) 750 mg in 0.9% sodium chloride 250 mL (VIAL-MATE)  750 mg IntraVENous Q24H    Vancomycin-Rx to Dose & Monitor  1 Each Other Rx Dosing/Monitoring    albuterol-ipratropium (DUO-NEB) 2.5 MG-0.5 MG/3 ML  3 mL Nebulization QID RT    budesonide (PULMICORT) 500 mcg/2 ml nebulizer suspension  500 mcg Nebulization BID RT    doxycycline (VIBRAMYCIN) 100 mg in 0.9% sodium chloride (MBP/ADV) 100 mL MBP  100 mg IntraVENous Q12H    famotidine (PF) (PEPCID) 20 mg in sodium chloride 0.9% 10 mL injection  20 mg IntraVENous Q24H    nicotine (NICODERM CQ) 14 mg/24 hr patch 1 Patch  1 Patch TransDERmal Q24H         Objective:   Vital Signs:    Blood pressure (!) 109/35, pulse 88, temperature 97.7 °F (36.5 °C), resp. rate 16, height 4' 11\" (1.499 m), weight 48.1 kg (106 lb), SpO2 100 %. Body mass index is 21.41 kg/m². O2 Device: Nasal cannula   O2 Flow Rate (L/min): 3.5 l/min   Temp (24hrs), Av.7 °F (36.5 °C), Min:97.5 °F (36.4 °C), Max:98.2 °F (36.8 °C)       Intake/Output:   Last shift:      No intake/output data recorded.   Last 3 shifts:  1901 -  0700  In: -   Out: 0737 [Urine:1475]    Intake/Output Summary (Last 24 hours) at 2019 1004  Last data filed at 9/16/2019 0546  Gross per 24 hour   Intake    Output 1125 ml   Net -1125 ml        Last 3 Recorded Weights in this Encounter    09/12/19 1407 09/13/19 1017   Weight: 48.1 kg (106 lb) 48.1 kg (106 lb)       Recent Labs     09/14/19  1010   PHI 7.459*   PCO2I 60.0*   PO2I 74*   HCO3I 42.6*   FIO2I 35       Physical Exam:  General: in no respiratory distress and acyanotic, cooperative, no distress, appears older than stated age, slow, on NC O2 at 3 Lpm  HEENT: PERRLA, throat normal without erythema or exudate  Neck: No abnormally enlarged lymph nodes or thyroid, supple  Chest: increased AP diameter  Lungs: moderate air entry and distant lung sounds, clear to auscultation bilaterally, normal percussion bilaterally, no tenderness/ rash  Heart: Regular rate and rhythm, S1S2 present or without murmur or extra heart sounds  Abdomen: non distended, bowel sounds normoactive, tympanic, abdomen is soft without significant tenderness, masses, organomegaly or guarding  Extremity: pitting edema bl; no cyanosis, clubbing  Neuro: alert, enrique, following simple commands, exam limitation  Skin: Skin color, texture, turgor fair       Data:       Recent Results (from the past 24 hour(s))   GLUCOSE, POC    Collection Time: 09/15/19 11:54 AM   Result Value Ref Range    Glucose (POC) 113 (H) 70 - 110 mg/dL   VANCOMYCIN, TROUGH    Collection Time: 09/15/19  4:29 PM   Result Value Ref Range    Vancomycin,trough 18.4 10.0 - 20.0 ug/mL   GLUCOSE, POC    Collection Time: 09/15/19  5:20 PM   Result Value Ref Range    Glucose (POC) 131 (H) 70 - 110 mg/dL   GLUCOSE, POC    Collection Time: 09/15/19  9:40 PM   Result Value Ref Range    Glucose (POC) 185 (H) 70 - 082 mg/dL   METABOLIC PANEL, BASIC    Collection Time: 09/16/19  3:45 AM   Result Value Ref Range    Sodium 142 136 - 145 mmol/L    Potassium 3.4 (L) 3.5 - 5.5 mmol/L    Chloride 103 100 - 111 mmol/L    CO2 34 (H) 21 - 32 mmol/L    Anion gap 5 3.0 - 18 mmol/L    Glucose 125 (H) 74 - 99 mg/dL    BUN 39 (H) 7.0 - 18 MG/DL    Creatinine 1.08 0.6 - 1.3 MG/DL    BUN/Creatinine ratio 36 (H) 12 - 20      GFR est AA 59 (L) >60 ml/min/1.73m2    GFR est non-AA 49 (L) >60 ml/min/1.73m2    Calcium 8.2 (L) 8.5 - 10.1 MG/DL   MAGNESIUM    Collection Time: 09/16/19  3:45 AM   Result Value Ref Range    Magnesium 2.1 1.6 - 2.6 mg/dL   CBC WITH AUTOMATED DIFF    Collection Time: 09/16/19  3:45 AM   Result Value Ref Range    WBC 2.4 (L) 4.6 - 13.2 K/uL    RBC 3.86 (L) 4.20 - 5.30 M/uL    HGB 11.1 (L) 12.0 - 16.0 g/dL    HCT 36.7 35.0 - 45.0 %    MCV 95.1 74.0 - 97.0 FL    MCH 28.8 24.0 - 34.0 PG    MCHC 30.2 (L) 31.0 - 37.0 g/dL    RDW 14.4 11.6 - 14.5 %    PLATELET 780 (L) 051 - 420 K/uL    MPV 10.7 9.2 - 11.8 FL    NEUTROPHILS 84 (H) 40 - 73 %    LYMPHOCYTES 10 (L) 21 - 52 %    MONOCYTES 6 3 - 10 %    EOSINOPHILS 0 0 - 5 %    BASOPHILS 0 0 - 2 %    ABS. NEUTROPHILS 2.0 1.8 - 8.0 K/UL    ABS. LYMPHOCYTES 0.3 (L) 0.9 - 3.6 K/UL    ABS. MONOCYTES 0.2 0.05 - 1.2 K/UL    ABS. EOSINOPHILS 0.0 0.0 - 0.4 K/UL    ABS.  BASOPHILS 0.0 0.0 - 0.1 K/UL    DF AUTOMATED     GLUCOSE, POC    Collection Time: 09/16/19  7:16 AM   Result Value Ref Range    Glucose (POC) 103 70 - 110 mg/dL         Chemistry Recent Labs     09/16/19  0345 09/15/19  0438 09/14/19  1600 09/14/19  0408   * 108*  --  109*    139  --  141   K 3.4* 4.3 3.5 3.0*    97*  --  92*   CO2 34* 36*  --  43*   BUN 39* 34*  --  25*   CREA 1.08 1.21  --  1.06   CA 8.2* 8.4*  --  8.3*   MG 2.1 2.1  --  2.1   AGAP 5 6  --  6   BUCR 36* 28*  --  24*   AP  --   --   --  64   TP  --   --   --  4.8*   ALB  --   --   --  2.7*   GLOB  --   --   --  2.1   AGRAT  --   --   --  1.3        Lactic Acid Lactic acid   Date Value Ref Range Status   09/14/2019 0.8 0.4 - 2.0 MMOL/L Final     Recent Labs     09/14/19  0408 09/13/19  1143   LAC 0.8 1.6        Liver Enzymes Protein, total   Date Value Ref Range Status   09/14/2019 4.8 (L) 6.4 - 8.2 g/dL Final Albumin   Date Value Ref Range Status   09/14/2019 2.7 (L) 3.4 - 5.0 g/dL Final     Globulin   Date Value Ref Range Status   09/14/2019 2.1 2.0 - 4.0 g/dL Final     A-G Ratio   Date Value Ref Range Status   09/14/2019 1.3 0.8 - 1.7   Final     AST (SGOT)   Date Value Ref Range Status   09/14/2019 22 10 - 38 U/L Final     Alk.  phosphatase   Date Value Ref Range Status   09/14/2019 64 45 - 117 U/L Final     Recent Labs     09/14/19  0408   TP 4.8*   ALB 2.7*   GLOB 2.1   AGRAT 1.3   SGOT 22   AP 64        CBC w/Diff Recent Labs     09/16/19  0345 09/15/19  0438 09/14/19  0408   WBC 2.4* 3.7* 2.7*   RBC 3.86* 3.84* 3.40*   HGB 11.1* 11.2* 10.1*   HCT 36.7 36.7 31.9*   * 122* 78*   GRANS 84* 88* 83*   LYMPH 10* 7* 13*   EOS 0 0 0        Cardiac Enzymes No results found for: CPK, CK, CKMMB, CKMB, RCK3, CKMBT, CKNDX, CKND1, ENEIDA, TROPT, TROIQ, BIB, TROPT, TNIPOC, BNP, BNPP     BNP No results found for: BNP, BNPP, XBNPT     Coagulation Recent Labs     09/14/19  0408   PTP 13.5   INR 1.1         Thyroid  No results found for: T4, T3U, TSH, TSHEXT, TSHEXT    No results found for: T4     Urinalysis Lab Results   Component Value Date/Time    Color YELLOW 09/15/2019 06:40 AM    Appearance CLEAR 09/15/2019 06:40 AM    Specific gravity 1.029 09/15/2019 06:40 AM    pH (UA) 8.0 09/15/2019 06:40 AM    Protein 30 (A) 09/15/2019 06:40 AM    Glucose NEGATIVE  09/15/2019 06:40 AM    Ketone NEGATIVE  09/15/2019 06:40 AM    Bilirubin NEGATIVE  09/15/2019 06:40 AM    Urobilinogen 1.0 09/15/2019 06:40 AM    Nitrites NEGATIVE  09/15/2019 06:40 AM    Leukocyte Esterase TRACE (A) 09/15/2019 06:40 AM    Epithelial cells 2+ 09/15/2019 06:40 AM    Bacteria FEW (A) 09/15/2019 06:40 AM    WBC 1 to 4 09/15/2019 06:40 AM    RBC 0 to 1 09/15/2019 06:40 AM        Micro  Recent Labs     09/13/19  1143   CULT NO GROWTH 3 DAYS  NO GROWTH 3 DAYS     Recent Labs     09/13/19  1143   CULT NO GROWTH 3 DAYS  NO GROWTH 3 DAYS          Culture data during this hospitalization. All Micro Results     Procedure Component Value Units Date/Time    CULTURE, BLOOD [[id number]] Collected:  09/13/19 1143    Order Status:  Completed Specimen:  Whole Blood Updated:  09/16/19 0850     Special Requests: NO SPECIAL REQUESTS        Culture result: NO GROWTH 3 DAYS       CULTURE, BLOOD [[id number]] Collected:  09/13/19 1143    Order Status:  Completed Specimen:  Whole Blood Updated:  09/16/19 0850     Special Requests: NO SPECIAL REQUESTS        Culture result: NO GROWTH 3 DAYS       CULTURE, URINE [[id number]] Collected:  09/15/19 0640    Order Status:  Completed Specimen:  Cath Urine Updated:  09/15/19 1326    LEGIONELLA PNEUMOPHILA AG, URINE [[id number]] Collected:  09/12/19 1630    Order Status:  Canceled Specimen:  Urine     STREP Tamea Ship, URINE [[id number]] Collected:  09/12/19 1630    Order Status:  Canceled Specimen:  Urine                PFT       Ultrasound       LE Doppler       ECHO       Images report reviewed by me:  CT (Most Recent) (CT chest reviewed by me) Results from [place] encounter on 09/12/19   CTA CHEST W OR W WO CONT    Narrative CTA CHEST    Indication: Shortness of breath, respiratory failure, COPD    Comparison: None. Technique: Axial imaging was obtained dynamically through the pulmonary arteries  using high-resolution CT angiographic protocol, without complications. In  addition, coronal and sagittal reconstructed MIP arteriograms were performed, to  better evaluate the pulmonary vasculature, and to increase sensitivity for  detection of pulmonary emboli. One or more dose reduction techniques were used on this CT: automated exposure  control, adjustment of the mAs and/or kVp according to patient's size, and  iterative reconstruction techniques. The specific techniques utilized on this CT  exam have been documented in the patient's electronic medical record.   Digital Imaging and Communications in Medicine (DICOM) format image data are  available to nonaffiliated external healthcare facilities or entities on a  secure, media free, reciprocally searchable basis with patient authorization for  at least a 12-month period after this study. CT angiogram quality parameters:  1. Overall exam quality - satisfactory: adequate   2. Adequacy of pulmonary enhancement-adequate: Sufficient enhancement. Main PA  density 190-250 HU   3. Adequacy of breath-hold-optimal: No respiratory artifact    4. There are no  significant noise, beam hardening, streak  artifacts affecting  scan quality.    ============    PULMONARY ARTERIES: The pulmonary arteries are opacified completely, without  evidence of filling defects to suggest pulmonary thromboembolism. AORTA:  Extensive atherosclerotic calcifications are seen with mild tortuosity;  no aneurysm or dissection, allowing for optimal contrast opacification directed  to the pulmonary arteries and in the setting of moderate cardiac motion  artifact. LUNGS: Severe emphysematous changes are seen throughout both lungs. There is a large right lower lobe mass measuring 3.1 cm x 2.6 cm with adjacent  atelectasis extending to the right heart border and the lateral chest wall. Pleural-based spiculated mass is seen in the right upper lobe measuring 1.8 cm  in length at the base by 1.2 cm in maximal thickness. Left lower lobe lung nodule noted measuring 0.6 cm (image 72) with surrounding  patchy infiltrate. PLEURA: Small bilateral pleural effusions are seen larger on the lower right  layering posteriorly with adjacent atelectasis. AIRWAY: Mild bronchial wall thickening noted. MEDIASTINUM: Normal heart size with coronary arterial calcifications. No  pericardial effusion. Thoracic aorta is tortuous and partially calcified without aneurysmal dilatation  or dissection. LYMPH NODES: No mediastinal, hilar or axillary lymphadenopathy. UPPER ABDOMEN: No acute abnormality.     OTHER: Degenerative changes are seen throughout the spine.    ============      Impression IMPRESSION:    No CT evidence of pulmonary thromboembolism. Severe emphysema with a large right lower lobe mass abutting the major fissure  and the diaphragm measuring up to 3.1 cm in maximal diameter. There is also a spiculated pleural-based mass in the right upper lobe measuring  1.8 cm at the base. 0.6 cm left lower lobe lung nodule present. These are suspicious for malignancy and metastatic disease. PET CT may be  helpful for further evaluation. Bilateral pleural effusions with adjacent atelectasis larger on the right. Other incidental findings as described above. CXR reviewed by me:  XR (Most Recent). CXR  reviewed by me and compared with previous CXR Results from Hospital Encounter encounter on 09/12/19   XR CHEST PORT    Narrative EXAM: CHEST RADIOGRAPH, SINGLE VIEW    CLINICAL INDICATION/HISTORY: Shortness of breath    COMPARISON: None. TECHNIQUE: Portable frontal view of the chest was obtained.     _______________    FINDINGS:    SUPPORT DEVICES: None. HEART AND MEDIASTINUM: Cardiomediastinal silhouette appears within normal  limits. Normal caliber thoracic aorta. No central vascular congestion. Asymmetric right hilar convexity    LUNGS AND PLEURAL SPACES: Small, right greater than left pleural effusions, each  with adjacent basilar atelectasis. Asymmetric, right greater than left apical  capping is also present. Rounded opacity is seen within the inferior aspect of  the right lower lobe. There is also asymmetric pleural-based wedge-shaped  opacity within the lateral aspect of the right upper lobe. BONY THORAX AND SOFT TISSUES: No acute osseous abnormality. Moderate  degenerative changes of right and left shoulders with left-sided rotator cuff  arthropathy.     _______________      Impression IMPRESSION:    Small, right greater than left pleural effusions with masslike opacity within  inferior right lower lobe and lateral right upper lobe. Asymmetric right hilar  convexity, concerning for lymphadenopathy. Recommend chest CT to evaluate  cluster of findings which are concerning for malignancy. IMPRESSION:   · Acute on chronic  hypercarbic and hypoxemic respiratory failure- COPd exacerbation. Non complaint with NIV on 5L at NH   · Acute on chronic  CHF- ? Diastolic dysfunction - not clear worse now; patient was on diuretics and had normal EF. Low albumins, chronic edema. Stable bicarbonate  · abnormal Ct of the chest- lung mass with possible mets; accessible for biopsy but high risk severe emphysema end stage copd. Family does not want to do bx  · Pneumonia ? HAP recently NH/hospital or aspiration van zosyn doxy . Urine strep leg  · Hypoxemia- multifactorial COPD/CHF diastolic/ pneumonia vs lung mass. · agiation   Patient Active Problem List   Diagnosis Code    Acute on chronic respiratory failure with hypoxia and hypercapnia ([facility]) J96.21, J96.22    COPD exacerbation ([facility]) J44.1    Lung mass R91.8    BiPAP (biphasic positive airway pressure) dependence Z99.89    Hypertension I10    Severe protein-calorie malnutrition ([facility]) E43    Thrombocytopenia ([facility]) D69.6    Erythromelalgia ([facility]) I73.81    Emphysema lung ([facility]) J43.9    Acute diastolic congestive heart failure ([facility]) I50.31     · Code status: DNR/DNI      RECOMMENDATIONS:   Respiratory:  Continue BiPAP routine at night and PRN during day- adjust settings per goal SPO2> 90%  Supplemental O2 via NC when not on BiPAP, titrate flow for goal SPO2> 90%  Aspiration prevention bundle, head of the bed at 30' all times  Continue bronchodilators, pulmonary hygiene care  Steroids  Gentle diuresis as needed  Lung mass possible cnacer   CVS: ? diastolic CHf ? BNP high r/o PE as well less  likely . Had normal echo 1 month ago. Trop ecg   ID: Abx: ZosynKikoy; stop vancomycin  Sepsis bundle and protocol followed. Follow cultures.    Deescalate antibiotic when appropriate. Hematology/Oncology: follow plt  Renal: daily bicarbonate, BMP   GI/: ppi   Endocrine: Monitor FSBS. SSI. Pain/Sedation: prn   Skin/Wound: legs swollen chronically tender erythema   Electrolytes: Replace electrolytes per ICU electrolyte replacement protocol. IVF: kvo  Nutrition: as tolerated when off of BiPAP  Prophylaxis: DVT Prophylaxis: Lovenox  GI Prophylaxis: PPI  patel  Advance Directive/Palliative Care: consulted. DNR/DNI    Will defer respective systems problem management to primary and other respective consultant and follow patient in ICU with primary and other medical team.  Further recommendations will be based on the patient's response to recommended treatment and results of the investigation ordered. Quality Care: PPI, DVT prophylaxis, HOB elevated, Infection control all reviewed and addressed. Care of plan d/w RN, RT, MDR.  D/w patient and RN, RT    High complexity decision making was performed during the evaluation of this patient at high risk for decompensation with multiple organ involvement. Total critical care time spent rendering care exclusive of procedures: 33 minutes.          Pam Joshi MD  9/16/2019

## 2019-09-16 NOTE — PROGRESS NOTES
Cm met with patients daughter Harish Orta verified that she will be using hospice services at an facility, choices include [facility] and [facility], both facilities have been contacted,daughter has been in contact with Frankie Enriquez [phone] from [facility] business office, at this time placement pending,daughter was provided with hospice agency list.

## 2019-09-16 NOTE — DIABETES MGMT
GLYCEMIC CONTROL PROGRESS NOTE:    - discussed in rounds, no known h/o DM   - Solumedrol 40 Q 8 hours steroid-associated hyperglycemia  - TDD = 2 units - Humalog Normal Insulin Sensitivity Corrective Coverage, recommend continue while steroids in place    Recent Glucose Results:   Lab Results   Component Value Date/Time     (H) 09/16/2019 03:45 AM    GLUCPOC 103 09/16/2019 07:16 AM    GLUCPOC 185 (H) 09/15/2019 09:40 PM    GLUCPOC 131 (H) 09/15/2019 05:20 PM     Celia Martins MS, RN, CDE  Glycemic Control Team  [phone]  Pager [phone] (M-TH 8:00-4:30P)  *After Hours pager [phone]

## 2019-09-16 NOTE — PROGRESS NOTES
Chart reviewed, noted pt requiring bedside sitter last night for safety due to agitation/psychosis, at this time family care decisions not finalized, cm will cont to review and assist with d/c planning once family make d/c decisions.

## 2019-09-16 NOTE — CONSULTS
[facility]: [phone] ([phone])  \[facility]\""[facility]: [phone]   [facility]: [phone]    Patient Name: Yue Valentine  YOB: 1940    Date of Initial Consult: 9/16/2019   Reason for Consult: care decisions   Requesting Provider: Dr. Jennifer Turner    Primary Care Physician: Garret Mendoza MD      SUMMARY:   Yue Valentine is a 78y.o. year old with a past history of chronic kidney disease, end stage COPD, hypertension, osteoporosis, chronic respiratory failure , who was admitted on 9/12/2019 from SNF ( the [facility]) with increased shortness of breath and hypoxia. Current medical issues leading to Palliative Medicine involvement include: 78year old female with end stage COPD, chronic respiratory failure, and newly dx lung mass. Palliative medicine is consulted for support and care decisions. 9/16/2019 Events of weekend noted and reviewed. Daughter Isa Nickerson met with us at bedside this am. Shared they had a wonderful weekend with her mother. She understands patient's medical condition and poor prognosis well and wishes to transition to comfort measures today. PALLIATIVE DIAGNOSES:   1. Advanced care plan discussion   2. Lung mass   3. COPD  4. Acute respiratory failure        PLAN:   1. Advanced care plan discussion patient has not executed an AMD and is currently not able due to confusion. She is a  two children Isa Nickerson and Lawanda Morillo. Son Lawanda Morillo has deferred all medical decision making to Isa Nickerson. Met with Isa Nickerson at bedside this am. Family has been well updated by consultants. Daughter shares their understanding of Jessika's poor prognosis and they really wish her comfort and peace at end of life. After family discussion they wish to transition a more comfort directed approach, understanding this means no further aggressive care such as IVAB, IVF\"s, blood draws, or x rays.  POST form introduced and discussed, Isa Nickerson signed for DNR/DNI, comfort measures, no feeding tubes. 2. Symptom management discussed use of opioids at end of life for symptom management, including use of Morphine will add low dose Roxanol 2 mg q 2 hr PRN for moderate pain and dyspnea or for severe pain and dyspnea 6 mg Roxanol q 2 hrs PRN. Will keep IV Morphine as well, 1 mg of moderate pain / dyspnea 2 mg for severe pain / dyspnea. Ativan for restlessness and anxiety. Daughter voiced understanding of use and agreeable. 3. Lung mass daughter does not wish for further investigation   4. Acute respiratory failure Spoke with daughter at times BIPAP use very distressful for patient, will make PRN, only if patient requests for comfort, will use medications to help with dyspnea. Spoke with RT and daughter nebulizer treatments also becoming burdensome will d/c at this time. 5. Initial consult note routed to primary continuity provider  6. Communicated plan of care with: Palliative IDT, daughter Michael Nuno, RT, RN, attending     GOALS OF CARE:  Patient/Health Care Proxy Stated Goals: Comfort      TREATMENT PREFERENCES:   Code Status: DNR    Advance Care Planning:  [] The [facility] Interdisciplinary Team has updated the ACP Navigator with [address] and Patient Capacity    Primary Decision Maker ([address]): gordon Nuno     Medical Interventions: Comfort measures   Other Instructions: comfort measures   Artificially Administered Nutrition: No feeding tube     Other:  As far as possible, the palliative care team has discussed with patient / health care proxy about goals of care / treatment preferences for patient. HISTORY:     History obtained from: chart and daughter     CHIEF COMPLAINT: shortness of breath, COPD and lung mass     HPI/SUBJECTIVE:    The patient is:   [] Verbal and participatory  [x] Non-participatory due to: confusion and over all poor medical condition   78year old female who presented from SNF with shortness of breath.  She has end stage COPD and newly diagnosed lung mass. She appears comfortable on nasal cannula. After discussions family wish to transition to comfort measures. Clinical Pain Assessment (nonverbal scale for nonverbal patients): Clinical Pain Assessment  Severity: 0     Activity (Movement): Lying quietly, normal position    Duration: for how long has pt been experiencing pain (e.g., 2 days, 1 month, years)  Frequency: how often pain is an issue (e.g., several times per day, once every few days, constant)     FUNCTIONAL ASSESSMENT:     Palliative Performance Scale (PPS):  PPS: 30    ECOG  ECOG Status : Completely disabled     PSYCHOSOCIAL/SPIRITUAL SCREENING:      Any spiritual / Restorationist concerns: unable to assess   [] Yes /  [] No    Caregiver Burnout:  [] Yes /  [x] No /  [] No Caregiver Present      Anticipatory grief assessment: unable to assess   [] Normal  / [] Maladaptive        REVIEW OF SYSTEMS:     Positive and pertinent negative findings in ROS are noted above in HPI. The following systems were [] reviewed / [x] unable to be reviewed as noted in HPI  Other findings are noted below. Systems: constitutional, ears/nose/mouth/throat, respiratory, gastrointestinal, genitourinary, musculoskeletal, integumentary, neurologic, psychiatric, endocrine. Positive findings noted below. Modified ESAS Completed by: provider   Fatigue: 8 Drowsiness: 3     Pain: 0   Anxiety: 0 Nausea: 0   Anorexia: 7 Dyspnea: 3     Constipation: No     Stool Occurrence(s): 1        PHYSICAL EXAM:     Wt Readings from Last 3 Encounters:   09/13/19 48.1 kg (106 lb)     Blood pressure 142/44, pulse (!) 110, temperature 97.8 °F (36.6 °C), resp. rate 29, height 4' 11\" (1.499 m), weight 48.1 kg (106 lb), SpO2 97 %. Pain:  Pain Scale 1: Numeric (0 - 10)  Pain Intensity 1: 0                 Last bowel movement: x3 yesterday     Constitutional: chronically ill appearing female who is lying in bed.  Comfortable appearing in NAD  Respiratory: breathing not labored, on nasal cannula   Skin: warm, dry  Neurologic: resting with eyes closed, per RN occ confusion          HISTORY:     Active Problems:    BiPAP (biphasic positive airway pressure) dependence (9/12/2019)      Acute on chronic respiratory failure with hypoxia and hypercapnia (HCC) (9/12/2019)      COPD exacerbation (HCC) (9/12/2019)      Hypertension (9/12/2019)      Severe protein-calorie malnutrition ([address] (9/12/2019)      Thrombocytopenia ([address] (9/12/2019)      Erythromelalgia ([address] (9/12/2019)      Lung mass (9/16/2019)      Emphysema lung ([address] (9/16/2019)      Acute diastolic congestive heart failure ([address] (9/16/2019)      Past Medical History:   Diagnosis Date    Chronic kidney disease (CKD)     COPD (chronic obstructive pulmonary disease) (HCC)     Erythromelalgia (HCC)     Gait abnormality     Hypertension     Hypoxia     Muscle weakness (generalized)     Nicotine dependence     Osteoporosis     Respiratory failure (HCC)     Vitamin D deficiency       History reviewed. No pertinent surgical history. History reviewed. No pertinent family history. History reviewed, no pertinent family history.   Social History     Tobacco Use    Smoking status: Former Smoker    Smokeless tobacco: Never Used   Substance Use Topics    Alcohol use: Not on file     Allergies   Allergen Reactions    Iodine Hives      Current Facility-Administered Medications   Medication Dose Route Frequency    LORazepam (INTENSOL) 2 mg/mL oral concentrate 0.5 mg  0.5 mg Oral Q4H PRN    ondansetron (ZOFRAN ODT) tablet 4 mg  4 mg Oral Q6H PRN    hyoscyamine SL (LEVSIN/SL) tablet 0.125 mg  0.125 mg SubLINGual Q4H PRN    glycopyrrolate (ROBINUL) injection 0.2 mg  0.2 mg IntraVENous Q4H PRN    morphine injection 1-2 mg  1-2 mg IntraVENous Q4H PRN    morphine (ROXANOL) concentrated oral syringe 2-6 mg  2-6 mg Oral Q2H PRN    atenolol (TENORMIN) tablet 25 mg  25 mg Oral QPM    nicotine (NICODERM CQ) 14 mg/24 hr patch 1 Patch  1 Patch TransDERmal Q24H    hydrALAZINE (APRESOLINE) 20 mg/mL injection 10 mg  10 mg IntraVENous Q6H PRN    haloperidol lactate (HALDOL) injection 2 mg  2 mg IntraMUSCular Q4H PRN        LAB AND IMAGING FINDINGS:     Lab Results   Component Value Date/Time    WBC 2.4 (L) 09/16/2019 03:45 AM    HGB 11.1 (L) 09/16/2019 03:45 AM    PLATELET 940 (L) 56/80/4231 03:45 AM     Lab Results   Component Value Date/Time    Sodium 142 09/16/2019 03:45 AM    Potassium 3.4 (L) 09/16/2019 03:45 AM    Chloride 103 09/16/2019 03:45 AM    CO2 34 (H) 09/16/2019 03:45 AM    BUN 39 (H) 09/16/2019 03:45 AM    Creatinine 1.08 09/16/2019 03:45 AM    Calcium 8.2 (L) 09/16/2019 03:45 AM    Magnesium 2.1 09/16/2019 03:45 AM      Lab Results   Component Value Date/Time    AST (SGOT) 22 09/14/2019 04:08 AM    Alk. phosphatase 64 09/14/2019 04:08 AM    Protein, total 4.8 (L) 09/14/2019 04:08 AM    Albumin 2.7 (L) 09/14/2019 04:08 AM    Globulin 2.1 09/14/2019 04:08 AM     Lab Results   Component Value Date/Time    INR 1.1 09/14/2019 04:08 AM    Prothrombin time 13.5 09/14/2019 04:08 AM      No results found for: IRON, FE, TIBC, IBCT, PSAT, FERR   No results found for: PH, PCO2, PO2  No components found for: [place]   Lab Results   Component Value Date/Time    CK 30 09/12/2019 02:10 PM    CK - MB 3.0 09/12/2019 02:10 PM              Total time:  70 minutes   Counseling / coordination time, spent as noted above: 50 minutes   > 50% counseling / coordination: yes with daughter Fatou Barnhart    Prolonged service was provided for  []30 min   []75 min in face to face time in the presence of the patient, spent as noted above. Time Start:   Time End:   Note: this can only be billed with 78766 (initial) or [id number] (follow up). If multiple start / stop times, list each separately.

## 2019-09-16 NOTE — PROGRESS NOTES
Problem: Diabetes Self-Management  Goal: *Disease process and treatment process  Description  Define diabetes and identify own type of diabetes; list 3 options for treating diabetes. Outcome: Progressing Towards Goal  Goal: *Incorporating nutritional management into lifestyle  Description  Describe effect of type, amount and timing of food on blood glucose; list 3 methods for planning meals. Outcome: Progressing Towards Goal  Goal: *Incorporating physical activity into lifestyle  Description  State effect of exercise on blood glucose levels. Outcome: Progressing Towards Goal  Goal: *Developing strategies to promote health/change behavior  Description  Define the ABC's of diabetes; identify appropriate screenings, schedule and personal plan for screenings. Outcome: Progressing Towards Goal  Goal: *Using medications safely  Description  State effect of diabetes medications on diabetes; name diabetes medication taking, action and side effects. Outcome: Progressing Towards Goal  Goal: *Monitoring blood glucose, interpreting and using results  Description  Identify recommended blood glucose targets  and personal targets. Outcome: Progressing Towards Goal  Goal: *Prevention, detection, treatment of acute complications  Description  List symptoms of hyper- and hypoglycemia; describe how to treat low blood sugar and actions for lowering  high blood glucose level. Outcome: Progressing Towards Goal  Goal: *Prevention, detection and treatment of chronic complications  Description  Define the natural course of diabetes and describe the relationship of blood glucose levels to long term complications of diabetes.   Outcome: Progressing Towards Goal  Goal: *Developing strategies to address psychosocial issues  Description  Describe feelings about living with diabetes; identify support needed and support network  Outcome: Progressing Towards Goal  Goal: *Insulin pump training  Outcome: Progressing Towards Goal  Goal: *Sick day guidelines  Outcome: Progressing Towards Goal  Goal: *Patient Specific Goal (EDIT GOAL, INSERT TEXT)  Outcome: Progressing Towards Goal     Problem: Patient Education: Go to Patient Education Activity  Goal: Patient/Family Education  Outcome: Progressing Towards Goal     Problem: Pressure Injury - Risk of  Goal: *Prevention of pressure injury  Description  Document Jaun Scale and appropriate interventions in the flowsheet. Outcome: Progressing Towards Goal  Note:   Pressure Injury Interventions:  Sensory Interventions: Keep linens dry and wrinkle-free    Moisture Interventions: Minimize layers    Activity Interventions: Pressure redistribution bed/mattress(bed type)    Mobility Interventions: HOB 30 degrees or less    Nutrition Interventions: Discuss nutritional consult with provider    Friction and Shear Interventions: Minimize layers                Problem: Patient Education: Go to Patient Education Activity  Goal: Patient/Family Education  Outcome: Progressing Towards Goal     Problem: Falls - Risk of  Goal: *Absence of Falls  Description  Document Zaria Goodson Fall Risk and appropriate interventions in the flowsheet.   Outcome: Progressing Towards Goal  Note:   Fall Risk Interventions:       Mentation Interventions: Door open when patient unattended    Medication Interventions: Evaluate medications/consider consulting pharmacy    Elimination Interventions: Call light in reach              Problem: Patient Education: Go to Patient Education Activity  Goal: Patient/Family Education  Outcome: Progressing Towards Goal     Problem: Patient Education: Go to Patient Education Activity  Goal: Patient/Family Education  Outcome: Progressing Towards Goal     Problem: Chronic Obstructive Pulmonary Disease (COPD)  Goal: *Oxygen saturation during activity within specified parameters  Outcome: Progressing Towards Goal  Goal: *Able to remain out of bed as prescribed  Outcome: Progressing Towards Goal  Goal: *Absence of hypoxia  Outcome: Progressing Towards Goal  Goal: *Optimize nutritional status  Outcome: Progressing Towards Goal

## 2019-09-16 NOTE — PROGRESS NOTES
Palliative medicine team, Omer ESPINO, Jose Daniel Obregon MSW and myself met with patient's daughter, Gutierrez Mercado. Gutierrez Mercado is appointed surrogate decision maker for her mother. She shared realization that her mother is at the end of her life from multiple life limiting conditions. She now wants comfort care for her mother. She would like hospice in a facility if possible as there is no caregiver available in the home. Gutierrez Mercado works two jobs and there is very little other family assistance available. She states medicaid has been applied for but is still pending. POST form completed for DNR/DNI, comfort measures and no feeding tube. Updated nursing, Dr. Valdez aTy and care Manager as to plan. PM remains available for continued support.

## 2019-09-16 NOTE — PROGRESS NOTES
Hospitalist Progress Note-critical care note     Patient: Yue Valentine MRN: [id number]  CSN: [id number]    YOB: 1940  Age: 78 y.o.   Sex: female    DOA: 9/12/2019 LOS:  LOS: 4 days            Chief complaint: acute on chronic respiratory failure, copd exacerbation , htn , severe protein malnutrition     Assessment/Plan         Hospital Problems  Never Reviewed          Codes Class Noted POA    Lung mass ICD-10-CM: R91.8  ICD-9-CM: 786.6  9/16/2019 Unknown        [facility] ICD-10-CM: J43.9  ICD-9-CM: 492.8  9/16/2019 Unknown        Acute diastolic congestive heart failure ([facility] ICD-10-CM: I50.31  ICD-9-CM: 428.31, 428.0  9/16/2019 Unknown        BiPAP (biphasic positive airway pressure) dependence ICD-10-CM: Z99.89  ICD-9-CM: V46.8  9/12/2019 Unknown        Acute on chronic respiratory failure with hypoxia and hypercapnia (HCC) ICD-10-CM: J96.21, J96.22  ICD-9-CM: 518.84, 786.09, 799.02  9/12/2019         COPD exacerbation ([facility] ICD-10-CM: J44.1  ICD-9-CM: 491.21  9/12/2019 Unknown        Hypertension ICD-10-CM: I10  ICD-9-CM: 401.9  9/12/2019 Unknown        Severe protein-calorie malnutrition ([facility] ICD-10-CM: E43  ICD-9-CM: 262  9/12/2019 Unknown        Thrombocytopenia ([facility] ICD-10-CM: D69.6  ICD-9-CM: 287.5  9/12/2019 Unknown        Erythromelalgia ([facility] ICD-10-CM: C17.12  ICD-9-CM: 443.82  9/12/2019 Unknown                Acute on chronic respiratory failure with hypoxia and hypercapnia   Still need bipap  Need morphine and sedation to keep on bipap   cta no pe, but lung mass and nodule   Cxr:Small, right greater than left pleural effusions with masslike opacity within  inferior right lower lobe and lateral right upper lobe   Effusion     Lung mass  pulm on board, ?biopsy-suspected can tolerate      Copd exacerbation/emphysema   Iv steroid, breathing tx     Possible pna  continue iv abx  And breathing tx         Pleural effusion-chf  Acute, diastolic   Continue  lasix   pvl negative for dvt   No PE         htn : continue home medication, hydralazine prn         Pancytopenia -resolving   Thrombocytopenia : improving   Stable at baseline       rn : agitated need pain meds and sedation     Still need  bipap     Poor prognosis,-discussed with palliative care and  Dr. Haydee Atkinson- will have family meet today for the care goal   Comfort care will be the best choice for pt with end stage of lung disease and lung mass     30 minutes of critical care time spent in the direct evaluation and treatment of this high risk patient. The reason for providing this level of medical care for this critically ill patient was due a critical illness that impaired one or more vital organ systems such that there was a high probability of imminent or life threatening deterioration in the patients condition. This care involved high complexity decision making to assess, manipulate, and support vital system functions, to treat this degreee vital organ system failure and to prevent further life threatening deterioration of the patients condition. Disposition :tbd,   Review of systems:  Unable to obtain due to on bipap   Vital signs/Intake and Output:  Visit Vitals  BP (!) 109/35   Pulse 88   Temp 97.7 °F (36.5 °C)   Resp 16   Ht 4' 11\" (1.499 m)   Wt 48.1 kg (106 lb)   SpO2 100%   BMI 21.41 kg/m²     Current Shift:  No intake/output data recorded. Last three shifts:  09/14 1901 - 09/16 0700  In: -   Out: 9406 [Urine:1475]    Physical Exam:  General: WD, WN. Alert, not cooperative, no acute distress    HEENT: NC, Atraumatic. PERRLA, anicteric sclerae. bipap mass noticed    Lungs: + Wheezing/Rhonchi/Rales.   Heart:  Regular  rhythm,  +murmur, No Rubs, No Gallops  Abdomen: Soft, Non distended, Non tender.  +Bowel sounds,   Extremities: No c/c, swelling better   Psych:   Calm   Neurologic:  Unable to obtain due to on bipap and medication           Labs: Results:       Chemistry Recent Labs     09/16/19  0345 09/15/19  7191 09/14/19  1600 09/14/19 0408   * 108*  --  109*    139  --  141   K 3.4* 4.3 3.5 3.0*    97*  --  92*   CO2 34* 36*  --  43*   BUN 39* 34*  --  25*   CREA 1.08 1.21  --  1.06   CA 8.2* 8.4*  --  8.3*   AGAP 5 6  --  6   BUCR 36* 28*  --  24*   AP  --   --   --  64   TP  --   --   --  4.8*   ALB  --   --   --  2.7*   GLOB  --   --   --  2.1   AGRAT  --   --   --  1.3      CBC w/Diff Recent Labs     09/16/19  0345 09/15/19  0438 09/14/19 0408   WBC 2.4* 3.7* 2.7*   RBC 3.86* 3.84* 3.40*   HGB 11.1* 11.2* 10.1*   HCT 36.7 36.7 31.9*   * 122* 78*   GRANS 84* 88* 83*   LYMPH 10* 7* 13*   EOS 0 0 0      Cardiac Enzymes No results for input(s): CPK, CKND1, ENEIDA in the last 72 hours. No lab exists for component: CKRMB, TROIP   Coagulation Recent Labs     09/14/19 0408   PTP 13.5   INR 1.1       Lipid Panel No results found for: CHOL, CHOLPOCT, CHOLX, CHLST, CHOLV, 623049, HDL, HDLP, LDL, LDLC, DLDLP, 153272, VLDLC, VLDL, TGLX, TRIGL, TRIGP, TGLPOCT, CHHD, CHHDX   BNP No results for input(s): BNPP in the last 72 hours.    Liver Enzymes Recent Labs     09/14/19 0408   TP 4.8*   ALB 2.7*   AP 64   SGOT 22      Thyroid Studies No results found for: T4, T3U, TSH, TSHEXT, TSHEXT     Procedures/imaging: see electronic medical records for all procedures/Xrays and details which were not copied into this note but were reviewed prior to creation of Roland Gonzalez MD

## 2019-09-16 NOTE — PROGRESS NOTES
NUTRITION FOLLOW-UP    RECOMMENDATIONS/PLAN:   - Diet: Liberalize to regular pt would benefit from regular diet   -Supplement: Add Magic Cup TID  Monitor labs/lytes, PO intakes, skin integrity, wt, fluid status, BM    NUTRITION ASSESSMENT:   Client Update: 78 yrs old Female with acute on chronic resp failure w/ hypoxia and hypercapnia, COPD exacerbation, HTN, thrombocytopenia, erythromelalgia, lung mass      FOOD/NUTRITION INTAKE   Diet Order:  DM   Food Allergies: NKFA/  Average PO Intake:      No data found. Pertinent Medications:  [x] Reviewed; pepcid, methylprednisolone, KCl,    Electrolyte Replacement Protocol: [x]K []Mg []PO4  Insulin:  []SSI  [x]Pre-meal   []Basal    []Drip  []None  Cultural/[place]ruze Food Preferences: None Identified    BIOCHEMICAL DATA & MEDICAL TESTS  Pertinent Labs:  [x] Reviewed; K-3.4 GFR est AA-59    ANTHROPOMETRICS  Height: 4' 11\" (149.9 cm)       Weight: 48.1 kg (106 lb)         BMI: 21.4 kg/m^2 normal weight (18.5%-24.9% BMI)   Adm Weight: 106 lbs                Weight change: 0lbs  Adjusted Body Weight: n/a     NUTRITION-FOCUSED PHYSICAL ASSESSMENT  Skin: stage II POA      GI: +BM 9/16/19-black watery     NUTRITION PRESCRIPTION  Calories: 7000-0794 kcal/day based on 40-45kcal/kg  Protein: 58-72 g/day based on 1.2-1.5 g/kg  Fluid: 3211-4073  ml/day based on 1 kcal/ml         NUTRITION DIAGNOSES:   1.. Malnutrition related to inadequate oral intake as evidence by stage II PU POA, +4 edema, nurse report       NUTRITION INTERVENTIONS:   INTERVENTIONS:        GOALS:  1. - Diet: Liberalize to regular pt would benefit from regular diet   -Supplement: Add Magic Cup TID 1. Encourage PO intake >50% at most meals by next review 3 days     LEARNING NEEDS (Diet, Supplementation, Food/Nutrient-Drug Interaction):   [] None Identified   [] Education provided/documented      Identified and patient: [] Declined   [x] Was not appropriate/indicated        NUTRITION MONITORING /EVALUATION:   Follow PO intake  Monitor wt  Monitor renal labs, electrolytes, fluid status  Monitor for additional supplement needs     Previous Recommendations Implemented: yes        Previous Goals Met:  yes -diet adv       [x] Participated in Interdisciplinary Rounds    [x] Interdisciplinary Care Plan Reviewed  DISCHARGE NUTRITION RECOMMENDATIONS ADDRESSED:      [x] To be determined closer to discharge    NUTRITION RISK:           [x] At risk                        [] Not currently at risk        Will follow-up per policy.   Raúl Johnson 1

## 2019-09-16 NOTE — ACP (ADVANCE CARE PLANNING)
Patient doesn't have an advanced directive. She's a  and has two adult children, daughter Irene Goetz and son Jan Kenyon. Son Jan Kenyon has deferred medical decision making to sister Irene Goetz. Irene Goetz will be the primary healthcare decision maker on patient's behalf, as patient is currently confused and wearing BiPAP. Irene Goetz provided financial POA document naming her, placed on paper chart.      Patient signed own Durable DNR at [facility]. [facility] rehab and received faxed copy. Daughter has confirmed wishes for DNR/DNI on patient's behalf. Code status was changed to reflect wishes for DNR/DNI. DDNR completed with daughter, Irene Goetz.    Patient is now on comfort care with plan to move to a facility with hospice.     NOK: Alton Pastor (daughter)  [phone]     Vikas Julian (son)  [phone]

## 2019-09-16 NOTE — PROGRESS NOTES
1910-Bedside verbal report received from ABHIJEET Hdez RN. Paar, mar, labs,kardex and patient status reviewed. 1930-Assessment completed. Pt appears  Comfortable and resting quietly. No interventions needed at this time. 0000-Repositioned in bed for comfort. Relaxing comfortably at this time. Mouth care provided. 0400-Pt HR slightly elevated at this time; 104-110. Pt is alert to self and place. When questioned patient if she is uncomfortable, she stated; \"Not right now\". Respirations are even and unlabored. 0600-Offered sips of fluids throughout shift. Pt does not have enough energy to take sips of water via straw. Able to take sips from side of cup. Oral care provided frequently through shift. 0710-Bedside verbal report given to ABHIJEET Hdez RN. Vince, mar, labs, kardex and patient status reviewed.

## 2019-09-16 NOTE — PROGRESS NOTES
0700 -Bedside verbal report received from Megha Belcher RN. Sbar, mar, labs, kardex and patient status reviewed. Assumed care of patient.

## 2019-09-16 NOTE — PALLIATIVE CARE
Thank you for consult    Full Note to follow;     Spoke with daughter Gutierrez Mercado at length. She is appointed surrogate medical decision maker. They would like to move to comfort measures today. Understanding this is a move away from aggressive treatment, including IVAB, blood draws and x rays. Discussed use of comfort medications including Roxanol and Ativan to help with pain and shortness of breath of which she was agreeable. POST form introduced and discussed signed by daughter for DNR/DNI, comfort measures, no feeding tubes. Appreciate Case management assistance ideally family would like for patient to return to the [place] [facility] with comfort measures if insurance will allow.

## 2019-09-17 PROBLEM — Z51.5 COMFORT MEASURES ONLY STATUS: Status: ACTIVE | Noted: 2019-01-01

## 2019-09-17 NOTE — PROGRESS NOTES
TRANSFER - IN REPORT:    Verbal report received from DEREK Parrish RN(name) on Yue Valentine  being received from ICU(unit) for routine progression of care      Report consisted of patients Situation, Background, Assessment and   Recommendations(SBAR). Information from the following report(s) SBAR, Kardex, Intake/Output, MAR and Recent Results was reviewed with the receiving nurse. Opportunity for questions and clarification was provided. Assessment completed upon patients arrival to unit and care assumed. 1554: written shift change report given to charge nurse for oncoming RN. Pt resting in bed watching TV.  No needs at this time

## 2019-09-17 NOTE — PROGRESS NOTES
0710-Bedside verbal report received from Mortimer Bullocks, RN. Sbar, mar, labs, kardex and patient status reviewed. 0800 Patient resting comfortably in bed, no s/s of pain or distress.

## 2019-09-17 NOTE — PROGRESS NOTES
Hospitalist Progress Note-critical care note     Patient: Kale Rodriguez MRN: [id number]  [facility]: [id number]    YOB: 1940  Age: 78 y.o.   Sex: female    DOA: 9/12/2019 LOS:  LOS: 5 days            Chief complaint: acute on chronic respiratory failure, copd exacerbation , htn , severe protein malnutrition     Assessment/Plan         Hospital Problems  Never Reviewed          Codes Class Noted POA    Comfort measures only status ICD-10-CM: Z51.5  ICD-9-CM: V66.7  9/17/2019 Unknown        Lung mass ICD-10-CM: R91.8  ICD-9-CM: 786.6  9/16/2019 Unknown        Emphysema lung ([facility].) ICD-10-CM: J43.9  ICD-9-CM: 492.8  9/16/2019 Unknown        Acute diastolic congestive heart failure ([facility] ICD-10-CM: I50.31  ICD-9-CM: 428.31, 428.0  9/16/2019 Unknown        BiPAP (biphasic positive airway pressure) dependence ICD-10-CM: Z99.89  ICD-9-CM: V46.8  9/12/2019 Unknown        Acute on chronic respiratory failure with hypoxia and hypercapnia (HCC) ICD-10-CM: J96.21, J96.22  ICD-9-CM: 518.84, 786.09, 799.02  9/12/2019         COPD exacerbation ([facility] ICD-10-CM: J44.1  ICD-9-CM: 491.21  9/12/2019 Unknown        Hypertension ICD-10-CM: I10  ICD-9-CM: 401.9  9/12/2019 Unknown        Severe protein-calorie malnutrition ([facility] ICD-10-CM: E43  ICD-9-CM: 262  9/12/2019 Unknown        Thrombocytopenia ([facility] ICD-10-CM: D69.6  ICD-9-CM: 287.5  9/12/2019 Unknown        Erythromelalgia ([facility] ICD-10-CM: K50.00  ICD-9-CM: 443.82  9/12/2019 Unknown                Acute on chronic respiratory failure with hypoxia and hypercapnia   Still need bipap  On nc2 now-comfort care was granted     Lung mass  Family does not want to more investigation      Copd exacerbation/emphysema   Was on Iv steroid/abx now  breathing tx prn      Possible pna  Was on  iv abx  And breath        Pleural effusion-chf  Acute, diastolic   Continue  lasix for symptoms treatment   pvl negative for dvt   No PE         htn : continue atenolol, hydralazine prn       Pancytopenia -resolving   Thrombocytopenia : improving   Stable at baseline       Comfort care was granted per family. She is comfortable   Disposition :1-2 days   Review of systems:  Unable to complete due to sickness, but no pain   Vital signs/Intake and Output:  Visit Vitals  /60   Pulse 99   Temp 97.6 °F (36.4 °C)   Resp 25   Ht 4' 11\" (1.499 m)   Wt 47.8 kg (105 lb 6.1 oz)   SpO2 (!) 65%   BMI 21.28 kg/m²     Current Shift:  No intake/output data recorded. Last three shifts:  09/15 1901 - 09/17 0700  In: -   Out: 8425 [Urine:1575]    Physical Exam:  General: WD, WN. Alert, some  cooperative, no acute distress    HEENT: NC, Atraumatic. PERRLA, anicteric sclerae. bipap mass noticed    Lungs: No Wheezing, +Rhonchi/Rales. Heart:  Regular  rhythm,  +murmur, No Rubs, No Gallops  Abdomen: Soft, Non distended, Non tender.  +Bowel sounds,   Extremities: No c/c, swelling better   Psych:   Calm   Neurologic:  Unable to obtain due to weakness           Labs: Results:       Chemistry Recent Labs     09/16/19  0345 09/15/19  0438 09/14/19  1600   * 108*  --     139  --    K 3.4* 4.3 3.5    97*  --    CO2 34* 36*  --    BUN 39* 34*  --    CREA 1.08 1.21  --    CA 8.2* 8.4*  --    AGAP 5 6  --    BUCR 36* 28*  --       CBC w/Diff Recent Labs     09/16/19  0345 09/15/19  0438   WBC 2.4* 3.7*   RBC 3.86* 3.84*   HGB 11.1* 11.2*   HCT 36.7 36.7   * 122*   GRANS 84* 88*   LYMPH 10* 7*   EOS 0 0      Cardiac Enzymes No results for input(s): CPK, CKND1, ENEIDA in the last 72 hours. No lab exists for component: CKRMB, TROIP   Coagulation No results for input(s): PTP, INR, APTT in the last 72 hours. No lab exists for component: INREXT, INREXT    Lipid Panel No results found for: CHOL, CHOLPOCT, CHOLX, CHLST, CHOLV, 523520, HDL, HDLP, LDL, LDLC, DLDLP, 064200, VLDLC, VLDL, TGLX, TRIGL, TRIGP, TGLPOCT, CHHD, CHHDX   BNP No results for input(s): BNPP in the last 72 hours.    Liver Enzymes No results for input(s): TP, ALB, TBIL, AP, SGOT, GPT in the last 72 hours.     No lab exists for component: DBIL   Thyroid Studies No results found for: T4, T3U, TSH, TSHEXT, TSHEXT     Procedures/imaging: see electronic medical records for all procedures/Xrays and details which were not copied into this note but were reviewed prior to creation of Analia Prince MD

## 2019-09-17 NOTE — HOSPICE
Pt/family pursuing hospice:yes   Admission dx: Chronic Respiratory Failure secondary to COPD and probably lung cancer  Anticipated hospital d/c date:9/18/2019 Discussion occurred with patient and/or family about preference of hospitalization once admitted to hospice: yes   Reviewed and discussed hospice philosophy and keeping the patient comfortable in their residence: yes     Narrative of events and/or assessment if applicable: Spoke with Pernell Espinoza discussed [address], services,criteria, and IDT. Answered all questions. DME ordered with delivery scheduled for [facility] 9/17/2019. Thank you for the referral to [facility]. If we can be of further assistance please contact [phone].         LAKE RosenthalN, RN  Nurse Liaison, [address], [address] [place] [address], [address] [place] [address] Str.  [phone]  [email or web address]

## 2019-09-17 NOTE — PROGRESS NOTES
Palliative Medicine  [place]: [phone] ([phone]  [facility]: [phone] ([phone])      Palliative team, SYLVESTER Hale and this MSW, followed up with patient at bedside. No family was visiting. Patient was asleep and appeared comfortable without visible distress. Bedside RN Edyta Chiu reported daughter Jan Reyes came to visit early this morning, but had to return to work. Patient is awaiting available bed on floor for transfer out of ICU. Palliative will continue to follow along. Thank you for the opportunity to assist in the care of Ms. Corrales.    Cristiana Carrillo, MSW, APHSW-C  Palliative Medicine

## 2019-09-17 NOTE — PROGRESS NOTES
At [facility] time [facility], cm waiting to see if facility can accept today. 230- return call received from daughter nikki [facility] at this [facility], daughter will be going with [facility] which they can accept once bipap machine has been delivered to facility, daughter selected [facility] services cm spoke with Jennifer Vaughn from [facility] hospice referral placed. Transition of Care Plan:     Communication to Patient/Family:  Met with patient and family, and they are agreeable to the transition plan. SNF/Rehab Transition:  Patient has been accepted to [facility] at [address] [facility], [facility] for SNF and meets criteria for admission. Patient will transported by medical transportation and expected to leave 09-18-19    Communication to SNF/Rehab:  Bedside RN,  has been notified to update the transition plan to the facility and call report [phone]. Discharge information has been updated on the AVS and communicated through [facility] or [facility]. Discharge instructions to be fax'd to facility at [phone]     Please include all hard scripts for controlled substances, med rec and dc summary, and AVS in packet. Please medicate for pain prior to dc if possible and needed to help offset delay when patient first arrives to facility. Reviewed and confirmed with facility, [facility] can manage the patient care needs for the following:     Russ Vasquez with (X) only those applicable:  Medication:  [x]Medications are available at the facility  []IV Antibiotics    []Controlled Substance  hard copies available sent.   []Weekly Labs    Equipment:  []CPAP/BiPAP  []Wound Vacuum  []Walters or Urinary Device  []PICC/Central Line  []Nebulizer  []Ventilator    Treatment:  []Isolation (for MRSA, VRE, etc.)  []Surgical Drain Management  []Tracheostomy Care  []Dressing Changes  []Dialysis with transportation  []PEG Care  [x]Oxygen  []Daily Weights for Heart Failure    Dietary:  []Any diet limitations  []Tube Feedings   []Total Parenteral Management (TPN)    Financial Resources:  [x]Medicaid Application Completed pt applied 2 weeks prior to [facility] admission    [x]UAI Completed and copy given to pt/family. completed at [facility]    []A screening has previously been completed. []Level II Completed    [] Private pay individual who will not become   financially eligible for Medicaid within 6 months from admission to a [address] [place] [address] facility. [] Individual refused to have screening conducted. []Medicaid Application Completed    []The screening denied because it was determined individual did not need/did not qualify for nursing facility level of care. [] Out of state residents seeking direct admission to a [address] facility. [] Individuals who are inpatients of an out of state hospital, or in state or out of state veterans/ hospital and seek direct admission to a [address] facility  [] Individuals who are pateints or residents of a state owned/operated facility that is licensed by [employer] and seek direct admission to [address] [place] [address]  [] A screening not required for enrollment in [facility] services as set out in [facility] 35-  [] [facility]) staff shall perform screenings of the [facility] clients. Advanced Care Plan:  []Surrogate Decision Maker of Care  []POA  []Communicated Code Status and copy sent.     Other:

## 2019-09-17 NOTE — HOSPICE
Referral received. Chart review in process. Thank you for the referral to [facility] [facility] [facility]. Please contact [phone] with any questions or concerns.             Stacy Muro, BSN, RN  Hospice Liaison  [place] [address] [facility], [facility] [address]  [phone]

## 2019-09-18 NOTE — PROGRESS NOTES
Report called to nursing staff at [address] [facility]. Patient transport is set up for 1630.

## 2019-09-18 NOTE — PROGRESS NOTES
2355-Awake, resting quietly in bed. No complaints voiced at this time. Call light and personal items within reach. 0530-Confused, tearful. Sat and talked with patient to reorient to environment and situation. Stable. Shift Summary:  Bed alarm activated for safety. Periods of lucidness and confusion towards end of shift. Stable at shift change.

## 2019-09-18 NOTE — ROUTINE PROCESS
Bedside and Verbal shift change report given to Matt Garg RN (oncoming nurse) by Ildefonso Emanuel RN (offgoing nurse). Report included the following information SBAR and Kardex.

## 2019-09-18 NOTE — DISCHARGE SUMMARY
Discharge Summary Patient: Aydee Bosch MRN: [id number]  CSN: [id number] YOB: 1940  Age: 78 y.o. Sex: female DOA: 9/12/2019 LOS:  LOS: 6 days   Discharge Date:   
 
Primary Care Provider:  Kate Wright MD 
 
Admission Diagnoses: Acute respiratory failure with hypoxia and hypercapnia ([facility] [J96.01, J96.02] BiPAP (biphasic positive airway pressure) dependence [Z99.89] Discharge Diagnoses:   
Hospital Problems  Never Reviewed Codes Class Noted POA Comfort measures only status ICD-10-CM: Z51.5 ICD-9-CM: V66.7  9/17/2019 Unknown Lung mass ICD-10-CM: R91.8 ICD-9-CM: 786.6  9/16/2019 Unknown Emphysema lung ([facility] ICD-10-CM: J43.9 ICD-9-CM: 492.8  9/16/2019 Unknown Acute diastolic congestive heart failure (HCC) ICD-10-CM: I50.31 ICD-9-CM: 428.31, 428.0  9/16/2019 Unknown BiPAP (biphasic positive airway pressure) dependence ICD-10-CM: X44.02 ICD-9-CM: V46.8  9/12/2019 Unknown Acute on chronic respiratory failure with hypoxia and hypercapnia (HCC) ICD-10-CM: J96.21, J96.22 
ICD-9-CM: 518.84, 786.09, 799.02  9/12/2019 COPD exacerbation ([facility] ICD-10-CM: J44.1 ICD-9-CM: 491.21  9/12/2019 Unknown Hypertension ICD-10-CM: I10 
ICD-9-CM: 401.9  9/12/2019 Unknown Severe protein-calorie malnutrition ([facility] ICD-10-CM: C53 ICD-9-CM: 426  9/12/2019 Unknown Thrombocytopenia ([facility] ICD-10-CM: D69.6 ICD-9-CM: 287.5  9/12/2019 Unknown Erythromelalgia ([facility] ICD-10-CM: C20.97 ICD-9-CM: 443.82  9/12/2019 Unknown Discharge Condition: stable Discharge Medications:    
Current Discharge Medication List  
  
START taking these medications Details  
ondansetron (ZOFRAN ODT) 4 mg disintegrating tablet Take 1 Tab by mouth every six (6) hours as needed for Nausea.  
Qty: 20 Tab, Refills: 0  
  
morphine (ROXANOL) 20 mg/mL concentrated oral syringe Take 0.1-0.3 mL by mouth every two (2) hours as needed for Pain for up to 1 day. Max Daily Amount: 72 mg. Qty: 1 mL, Refills: 0 Associated Diagnoses: Comfort measures only status  
  
hyoscyamine SL (LEVSIN/SL) 0.125 mg SL tablet 1 Tab by SubLINGual route every four (4) hours as needed (secretion). Qty: 20 Tab, Refills: 0 LORazepam (INTENSOL) 2 mg/mL concentrated solution Take 0.25 mL by mouth every four (4) hours as needed for Agitation or Anxiety. Max Daily Amount: 3 mg. Qty: 1 mL, Refills: 0 Associated Diagnoses: Comfort measures only status CONTINUE these medications which have NOT CHANGED Details  
furosemide (LASIX) 20 mg tablet Take 20 mg by mouth daily. atenolol (TENORMIN) 25 mg tablet Take 25 mg by mouth every evening. STOP taking these medications  
  
 nicotine (NICODERM CQ) 14 mg/24 hr patch Comments:  
Reason for Stopping:   
   
 predniSONE (DELTASONE) 20 mg tablet Comments:  
Reason for Stopping:   
   
 bumetanide (BUMEX) 2 mg tablet Comments:  
Reason for Stopping:   
   
  
 
 
Procedures :none Consults: Pulmonary/Critical Care PHYSICAL EXAM  
Visit Vitals /55 Pulse 60 Temp 97.8 °F (36.6 °C) Resp 18 Ht 4' 11\" (1.499 m) Wt 48.1 kg (106 lb 0.7 oz) SpO2 100% BMI 21.42 kg/m² General: Awake, cooperative, no acute distress   
HEENT: NC, Atraumatic. PERRLA, EOMI. Anicteric sclerae. Lungs:  Mild Wheezing, +Rhonchi/Rales. Heart:  Regular  rhythm,  + murmur, No Rubs, No Gallops Abdomen: Soft, Non distended, Non tender. +Bowel sounds, Extremities: No c/c/e Psych:   Not anxious or agitated. Neurologic:  No acute neurological deficits. Admission HPI :  
Mckay Ray is a 78 y.o. female who hx copd on bipap at night, O2 dependent ,htn was sent to ER from SNF due to sob and desat.  She was found O2 63% per EMS, sat O2 better after breathing tx and iv steroid administrated. She was found labor breathing on arrival,bipap was put on, abg 7.4, pCO2 81, PO2 59 while on bipap. cxr :Small, right greater than left pleural effusions with masslike opacity within 
inferior right lower lobe and lateral right upper lobe. Daughter and other family were at the bedside. Daughter reported that pt was d/c from [facility] recently and d/c to rehab for one week. She refused to be put on \"machine-cpap\" at night. No fever/chills reported Hospital Course :  
She was admitted for acute on chronic respiratory failure with hypoxia and hypercapnia. bipap was put on , iv steroid and abx were administrated for copd exacerbation and possible hcap. Lasix was given for pleural effusion. cta done and pe was ruled out lung mass. She has end stage of copd and need trilogy at night time. She used bipap during her stay. Dnr/i was granted per family. Family does not want biopsy for the lung mass. Comfort care was granted per family. She will go to in-pt hospice. Continue bipap as needed in in-pt hospice Activity: Activity as tolerated Diet: Regular Diet Follow-up: hospice physician Disposition: in-pt hospice Minutes spent on discharge: 45 min Labs: Results:  
   
Chemistry Recent Labs  
  09/16/19 
0345 *   
K 3.4*  
 CO2 34* BUN 39* CREA 1.08  
CA 8.2* AGAP 5  
BUCR 36* CBC w/Diff Recent Labs  
  09/16/19 
0345 WBC 2.4*  
RBC 3.86* HGB 11.1*  
HCT 36.7 * GRANS 84* LYMPH 10* EOS 0 Cardiac Enzymes No results for input(s): CPK, CKND1, ENEIDA in the last 72 hours. No lab exists for component: Reed Craig Coagulation No results for input(s): PTP, INR, APTT in the last 72 hours. No lab exists for component: INREXT Lipid Panel No results found for: CHOL, CHOLPOCT, CHOLX, CHLST, CHOLV, 313763, HDL, HDLP, LDL, LDLC, DLDLP, 593847, VLDLC, VLDL, TGLX, TRIGL, TRIGP, TGLPOCT, CHHD, CHHDX BNP No results for input(s): BNPP in the last 72 hours. Liver Enzymes No results for input(s): TP, ALB, TBIL, AP, SGOT, GPT in the last 72 hours. No lab exists for component: DBIL Thyroid Studies No results found for: T4, T3U, TSH, TSHEXT Significant Diagnostic Studies: Cta Chest W Or W Wo Cont Result Date: 9/14/2019 CTA CHEST Indication: Shortness of breath, respiratory failure, COPD Comparison: None. Technique: Axial imaging was obtained dynamically through the pulmonary arteries using high-resolution CT angiographic protocol, without complications. In addition, coronal and sagittal reconstructed MIP arteriograms were performed, to better evaluate the pulmonary vasculature, and to increase sensitivity for detection of pulmonary emboli. One or more dose reduction techniques were used on this CT: automated exposure control, adjustment of the mAs and/or kVp according to patient's size, and iterative reconstruction techniques. The specific techniques utilized on this CT exam have been documented in the patient's electronic medical record. Digital Imaging and Communications in Medicine (DICOM) format image data are available to nonaffiliated external healthcare facilities or entities on a secure, media free, reciprocally searchable basis with patient authorization for at least a 12-month period after this study. CT angiogram quality parameters: 1. Overall exam quality - satisfactory: adequate 2. Adequacy of pulmonary enhancement-adequate: Sufficient enhancement. Main PA density 190-250 HU 3. Adequacy of breath-hold-optimal: No respiratory artifact  4. There are no  significant noise, beam hardening, streak  artifacts affecting scan quality. ============ PULMONARY ARTERIES: The pulmonary arteries are opacified completely, without evidence of filling defects to suggest pulmonary thromboembolism.   AORTA:  Extensive atherosclerotic calcifications are seen with mild tortuosity; no aneurysm or dissection, allowing for optimal contrast opacification directed to the pulmonary arteries and in the setting of moderate cardiac motion artifact. LUNGS: Severe emphysematous changes are seen throughout both lungs. There is a large right lower lobe mass measuring 3.1 cm x 2.6 cm with adjacent atelectasis extending to the right heart border and the lateral chest wall. Pleural-based spiculated mass is seen in the right upper lobe measuring 1.8 cm in length at the base by 1.2 cm in maximal thickness. Left lower lobe lung nodule noted measuring 0.6 cm (image 72) with surrounding patchy infiltrate. PLEURA: Small bilateral pleural effusions are seen larger on the lower right layering posteriorly with adjacent atelectasis. AIRWAY: Mild bronchial wall thickening noted. MEDIASTINUM: Normal heart size with coronary arterial calcifications. No pericardial effusion. Thoracic aorta is tortuous and partially calcified without aneurysmal dilatation or dissection. LYMPH NODES: No mediastinal, hilar or axillary lymphadenopathy. UPPER ABDOMEN: No acute abnormality. OTHER: Degenerative changes are seen throughout the spine. ============ IMPRESSION: No CT evidence of pulmonary thromboembolism. Severe emphysema with a large right lower lobe mass abutting the major fissure and the diaphragm measuring up to 3.1 cm in maximal diameter. There is also a spiculated pleural-based mass in the right upper lobe measuring 1.8 cm at the base. 0.6 cm left lower lobe lung nodule present. These are suspicious for malignancy and metastatic disease. PET CT may be helpful for further evaluation. Bilateral pleural effusions with adjacent atelectasis larger on the right. Other incidental findings as described above. Xr Chest Edmar Larson Result Date: 9/12/2019 EXAM: CHEST RADIOGRAPH, SINGLE VIEW CLINICAL INDICATION/HISTORY: Shortness of breath COMPARISON: None.  TECHNIQUE: Portable frontal view of the chest was obtained. _______________ FINDINGS: SUPPORT DEVICES: None. HEART AND MEDIASTINUM: Cardiomediastinal silhouette appears within normal limits. Normal caliber thoracic aorta. No central vascular congestion. Asymmetric right hilar convexity LUNGS AND PLEURAL SPACES: Small, right greater than left pleural effusions, each with adjacent basilar atelectasis. Asymmetric, right greater than left apical capping is also present. Rounded opacity is seen within the inferior aspect of the right lower lobe. There is also asymmetric pleural-based wedge-shaped opacity within the lateral aspect of the right upper lobe. BONY THORAX AND SOFT TISSUES: No acute osseous abnormality. Moderate degenerative changes of right and left shoulders with left-sided rotator cuff arthropathy. _______________ IMPRESSION: Small, right greater than left pleural effusions with masslike opacity within inferior right lower lobe and lateral right upper lobe. Asymmetric right hilar convexity, concerning for lymphadenopathy. Recommend chest CT to evaluate cluster of findings which are concerning for malignancy. [facility] CC: Kate Wright MD

## 2019-09-18 NOTE — PROGRESS NOTES
0730 Report received from [address] [place] [address], patient is on comfort measures only, patel catheter in place, NC 6L per Dr Luz Maria Root, will turn patient as tolerated and provide comfort, check for visual cues for pain.  NAD at present, patient confused but calm

## 2019-09-18 NOTE — PROGRESS NOTES
Transition of Care Plan:  
 
Communication to Patient/Family: CM spoke with pt's family (daughter/Donna), and they are agreeable to the transition plan today. SNF/Rehab Transition: 
Patient has been accepted to [facility] at [address] [facility] [address] [facility] for SNF and meets criteria for admission. Patient will transported by medical transport and expected to leave at 4:30pm. 
 
Communication to SNF/Rehab: 
Bedside RN,  has been notified to update the transition plan to the facility and call report [phone]. Discharge information has been updated on the AVS and communicated through [facility] or CC Link. Discharge instructions to be fax'd to facility at [phone] Please include all hard scripts for controlled substances, med rec and dc summary, and AVS in packet. Please medicate for pain prior to dc if possible and needed to help offset delay when patient first arrives to facility. Reviewed and confirmed with facility, [facility] can manage the patient care needs for the following:  
 
Lavina Kanner with (X) only those applicable: 
Medication: 
[]Medications are available at the facility []IV Antibiotics []Controlled Substance  hard copies available sent. []Weekly Labs Equipment: 
[]CPAP/BiPAP []Wound Vacuum []Walters or Urinary Device []PICC/Central Line []Nebulizer []Ventilator Treatment: 
[]Isolation (for MRSA, VRE, etc.) []Surgical Drain Management []Tracheostomy Care 
[]Dressing Changes []Dialysis with transportation []PEG Care []Oxygen []Daily Weights for Heart Failure Dietary: 
[]Any diet limitations []Tube Feedings []Total Parenteral Management (TPN) Financial Resources: 
[]Medicaid Application Completed [x]UAI Completed and copy given to pt/family. [x]A screening has previously been completed. At [facility] []Level II Completed 
 
[] Private pay individual who will not become financially eligible for Medicaid within 6 months from admission to a [address] [facility] facility. [] Individual refused to have screening conducted. []Medicaid Application Completed []The screening denied because it was determined individual did not need/did not qualify for nursing facility level of care. [] Out of state residents seeking direct admission to a [address] facility. [] Individuals who are inpatients of an out of state hospital, or in state or out of state veterans/ hospital and seek direct admission to a [address] facility [] Individuals who are pateints or residents of a state owned/operated facility that is licensed by Department of [employer] and seek direct admission to [address] facility [] A screening not required for enrollment in [facility] services as set out in 12 VAC 30- [] [facility]) staff shall perform screenings of the [facility] clients. Advanced Care Plan: 
[]Surrogate Decision Maker of Care 
[]POA []Communicated Code Status and copy sent. Other:  
   
 
Care Management Interventions PCP Verified by CM: Yes 
Palliative Care Criteria Met (RRAT>21 & CHF Dx)?: Yes Mode of Transport at Discharge: BLS Transition of Care Consult (CM Consult): Home Hospice(hospice services) [facility] HSPTL: Yes Current Support Network: [address] Confirm Follow Up Transport: Other (see comment) Plan discussed with Pt/Family/Caregiver: Yes Discharge Location Discharge Placement: Skilled nursing facility

## 2019-09-18 NOTE — PROGRESS NOTES
Problem: Falls - Risk of  Goal: *Absence of Falls  Description  Document Sunita Phoenix Fall Risk and appropriate interventions in the flowsheet.   Outcome: Progressing Towards Goal  Note:   Fall Risk Interventions:       Mentation Interventions: Adequate sleep, hydration, pain control, Door open when patient unattended, Evaluate medications/consider consulting pharmacy, Eyeglasses and hearing aids, Increase mobility, More frequent rounding, Reorient patient, Room close to nurse's station, Toileting rounds, Update white board    Medication Interventions: Evaluate medications/consider consulting pharmacy, Patient to call before getting OOB, Teach patient to arise slowly    Elimination Interventions: Call light in reach, Patient to call for help with toileting needs, Stay With Me (per policy), Toilet paper/wipes in reach, Toileting schedule/hourly rounds              Problem: Patient Education: Go to Patient Education Activity  Goal: Patient/Family Education  Outcome: Progressing Towards Goal

## 2019-09-18 NOTE — PROGRESS NOTES
1715 Assessment completed and documented in flow sheet. Pt denies any further needs at this time. Pt in NAD with bed in low position, wheels locked and call bell within reach. Purposeful rounding completed. Pt resting quietly. No further needs voiced at this time. 1829 Scheduled medications administered as ordered. Purposeful rounding completed. Pt resting quietly. No further needs voiced at this time. 2048 Reassessment completed with no changes noted. Bed locked, in lowest position, with call light within reach. Purposeful rounding completed. Pt resting quietly. No further needs voiced at this time. 2215 Purposeful rounding completed. Pt resting quietly. No further needs voiced at this time. 2305 notified by PCT pt O2 sats 82 % on 3.5 L O2. Into assess pt. Pt in no apparent distress. Pt O2 sats on 4 L 82-84%. Page out to respiratory. 2308 spoke with respiratory. Advised to increase to 6 L O2. Recheck O2= 90%. 2324 Page out to DR Aspen Montgomery. Updated on pt status. No new orders at this time. Continue to keep pt comfortable. 2346 Bedside and Verbal shift change report given to Stacy Singh RN (oncoming nurse) by Adryan Aaruz RN (offgoing nurse). Report included the following information SBAR, Intake/Output, MAR and Recent Results.

## 2019-09-18 NOTE — ROUTINE PROCESS
Bedside and Verbal shift change report given to Link Urbano RN (oncoming nurse) by Jose Antonio Loomis RN (offgoing nurse). Report included the following information SBAR and Kardex.

## 2019-09-18 NOTE — HOSPICE
Spoke with admin at [address] [place] [address]. DME was not delivered last night. DME will be STAT delivered this morning.

## 2019-09-18 NOTE — PROGRESS NOTES
Called to bedside for patient with desaturation, SATS 84% on 4lpm per RN. Advised to increase to 6lpm and RT to assess. Chart reviewed, and patient is DNR with comfort measures at this time. Physical assessment finds patient awake/alert, tense, with some c/o S. O.B, no distress noted,  BS's decreased all fields. O2 left at 6lpm.     Discussed with RN. May need Ativan or Morphine ( ordered), if becomes air hungry.

## 2019-09-20 NOTE — PROGRESS NOTES
Community Care Team Documentation for Patient in [facility]     Patient discharged from [address] to [address] [place] [address], on 9/17/19. Hospital Discharge diagnosis:       Lung mass   Emphysema   Acute diastolic CHF   Comfort measures only status   BIPAP   Resp failure   COPD Exacerbation   HTN      SNF Attending Provider:      Anticipated discharge date from SNF:  TBD    PCP : Mohit Diane MD    Nurse Navigator:     [facility] Team rounds completed, updates provided by facility. Brief Summary of Care:    Patient in hospice care. Declining at present. Less Responsive. RRAT:  Medium Risk            18       Total Score        3 Has Seen PCP in Last 6 Months (Yes=3, No=0)    15 Charlson Comorbidity Score (Age + Comorbid Conditions)        Criteria that do not apply:    . Living with Significant Other. Assisted Living. LTAC. SNF. or   Rehab    Patient Length of Stay (>5 days = 3)    IP Visits Last 12 Months (1-3=4, 4=9, >4=11)    Pt.  Coverage (Medicare=5 , Medicaid, or Self-Pay=4)        Active Ambulatory Problems     Diagnosis Date Noted    BiPAP (biphasic positive airway pressure) dependence 09/12/2019    Acute on chronic respiratory failure with hypoxia and hypercapnia (HCC) 09/12/2019    COPD exacerbation ([address] 09/12/2019    Hypertension 09/12/2019    Severe protein-calorie malnutrition (HCC) 09/12/2019    Thrombocytopenia ([address] 09/12/2019    Erythromelalgia ([address] 09/12/2019    Lung mass 09/16/2019    Emphysema lung ([address] 09/16/2019    Acute diastolic congestive heart failure ([address] 09/16/2019    Comfort measures only status 09/17/2019     Resolved Ambulatory Problems     Diagnosis Date Noted    No Resolved Ambulatory Problems     Past Medical History:   Diagnosis Date    Chronic kidney disease (CKD)     COPD (chronic obstructive pulmonary disease) (HCC)     Gait abnormality     Hypoxia     Muscle weakness (generalized)     Nicotine dependence     Osteoporosis     Respiratory failure (HCC)     Vitamin D deficiency